# Patient Record
Sex: FEMALE | Race: BLACK OR AFRICAN AMERICAN | Employment: FULL TIME | URBAN - METROPOLITAN AREA
[De-identification: names, ages, dates, MRNs, and addresses within clinical notes are randomized per-mention and may not be internally consistent; named-entity substitution may affect disease eponyms.]

---

## 2017-07-21 ENCOUNTER — HOSPITAL ENCOUNTER (OUTPATIENT)
Dept: SURGERY | Age: 57
Discharge: HOME | End: 2017-07-21
Attending: OBSTETRICS & GYNECOLOGY
Payer: COMMERCIAL

## 2017-07-21 VITALS
HEIGHT: 63 IN | BODY MASS INDEX: 48.37 KG/M2 | WEIGHT: 273 LBS | SYSTOLIC BLOOD PRESSURE: 146 MMHG | HEART RATE: 78 BPM | OXYGEN SATURATION: 99 % | TEMPERATURE: 98.3 F | RESPIRATION RATE: 18 BRPM | DIASTOLIC BLOOD PRESSURE: 69 MMHG

## 2017-07-21 LAB
HGB BLD-MCNC: 9 G/DL (ref 11.7–15.4)
POTASSIUM SERPL-SCNC: 3.8 MMOL/L (ref 3.5–5.1)

## 2017-07-21 PROCEDURE — 85018 HEMOGLOBIN: CPT | Performed by: ANESTHESIOLOGY

## 2017-07-21 PROCEDURE — 84132 ASSAY OF SERUM POTASSIUM: CPT | Performed by: ANESTHESIOLOGY

## 2017-07-21 RX ORDER — ALBUTEROL SULFATE 2.5 MG/.5ML
2.5 SOLUTION RESPIRATORY (INHALATION)
COMMUNITY

## 2017-07-21 RX ORDER — CHOLECALCIFEROL (VITAMIN D3) 125 MCG
CAPSULE ORAL
COMMUNITY

## 2017-07-21 RX ORDER — FLUTICASONE PROPIONATE AND SALMETEROL 250; 50 UG/1; UG/1
1 POWDER RESPIRATORY (INHALATION)
COMMUNITY

## 2017-07-21 RX ORDER — LANOLIN ALCOHOL/MO/W.PET/CERES
CREAM (GRAM) TOPICAL
COMMUNITY

## 2017-07-21 RX ORDER — IBUPROFEN 200 MG
TABLET ORAL
COMMUNITY

## 2017-07-21 NOTE — PERIOP NOTES
Patient verified name, , and surgery as listed in [facility]. Type 1B surgery, PAT assessment complete. Labs per surgeon: None. Labs per anesthesia protocol: Hgb and K+; results within anesthesia limits. Abnormal HGB routed via [employer] to Dr. Shobha Rebolledo new orders received. EKG: none per anesthesia protocol. Hibiclens and instructions given per hospital policy. Patient provided with handouts including Guide to Surgery, Pain Management, Hand Hygiene, Blood Transfusion Education, and [facility] Brochure. Patient answered medical/surgical history questions at their best of ability. All prior to admission medications documented in [facility]. Original medication prescription bottle NOT visualized during patient appointment. Patient instructed to hold all vitamins 7 days prior to surgery and NSAIDS 5 days prior to surgery, patient verbalized understanding. Medications to be held: All vitamins, ibuprofen, and aleve. Patient instructed to continue previous medications as prescribed prior to surgery and to take the following medications the day of surgery according to anesthesia guidelines with a small sip of water: Albuterol nebulizer and Advair. Patient instructed to bring inhaler and CPAP machine to the hospital on the DOS and give to nurse. Patient teach back successful and demonstrates knowledge of instructions.

## 2017-07-21 NOTE — PERIOP NOTES
7/21/2017  4:07 PM - [place], Lab In [place]   Component Results   Component Value Flag Ref Range Units Status   HGB 9.0 (L) 11.7 - 15.4 g/dL Final

## 2017-07-24 ENCOUNTER — ANESTHESIA EVENT (OUTPATIENT)
Dept: SURGERY | Age: 57
End: 2017-07-24
Payer: COMMERCIAL

## 2017-07-24 RX ORDER — MIDAZOLAM HYDROCHLORIDE 1 MG/ML
2 INJECTION, SOLUTION INTRAMUSCULAR; INTRAVENOUS ONCE
Status: CANCELLED | OUTPATIENT
Start: 2017-07-24 | End: 2017-07-24

## 2017-07-24 RX ORDER — FENTANYL CITRATE 50 UG/ML
100 INJECTION, SOLUTION INTRAMUSCULAR; INTRAVENOUS ONCE
Status: CANCELLED | OUTPATIENT
Start: 2017-07-24 | End: 2017-07-24

## 2017-07-25 ENCOUNTER — HOSPITAL ENCOUNTER (OUTPATIENT)
Age: 57
Setting detail: OUTPATIENT SURGERY
Discharge: HOME | End: 2017-07-25
Attending: OBSTETRICS & GYNECOLOGY | Admitting: OBSTETRICS & GYNECOLOGY
Payer: COMMERCIAL

## 2017-07-25 ENCOUNTER — ANESTHESIA (OUTPATIENT)
Dept: SURGERY | Age: 57
End: 2017-07-25
Payer: COMMERCIAL

## 2017-07-25 VITALS
HEART RATE: 71 BPM | TEMPERATURE: 97.1 F | OXYGEN SATURATION: 96 % | WEIGHT: 273 LBS | SYSTOLIC BLOOD PRESSURE: 153 MMHG | HEIGHT: 63 IN | RESPIRATION RATE: 15 BRPM | DIASTOLIC BLOOD PRESSURE: 96 MMHG | BODY MASS INDEX: 48.37 KG/M2

## 2017-07-25 PROCEDURE — 76010000138 HC OR TIME 0.5 TO 1 HR: Performed by: OBSTETRICS & GYNECOLOGY

## 2017-07-25 PROCEDURE — 74011000250 HC RX REV CODE- 250

## 2017-07-25 PROCEDURE — 76060000032 HC ANESTHESIA 0.5 TO 1 HR: Performed by: OBSTETRICS & GYNECOLOGY

## 2017-07-25 PROCEDURE — 77030020782 HC GWN BAIR PAWS FLX 3M -B: Performed by: ANESTHESIOLOGY

## 2017-07-25 PROCEDURE — 74011250636 HC RX REV CODE- 250/636

## 2017-07-25 PROCEDURE — 76210000020 HC REC RM PH II FIRST 0.5 HR: Performed by: OBSTETRICS & GYNECOLOGY

## 2017-07-25 PROCEDURE — 77030008703 HC TU ET UNCUF COVD -A: Performed by: ANESTHESIOLOGY

## 2017-07-25 PROCEDURE — 88305 TISSUE EXAM BY PATHOLOGIST: CPT | Performed by: OBSTETRICS & GYNECOLOGY

## 2017-07-25 PROCEDURE — 74011250636 HC RX REV CODE- 250/636: Performed by: ANESTHESIOLOGY

## 2017-07-25 PROCEDURE — 77030012317 HC CATH URET INT COVD -A: Performed by: OBSTETRICS & GYNECOLOGY

## 2017-07-25 PROCEDURE — 74011250637 HC RX REV CODE- 250/637: Performed by: ANESTHESIOLOGY

## 2017-07-25 PROCEDURE — 74011000250 HC RX REV CODE- 250: Performed by: ANESTHESIOLOGY

## 2017-07-25 PROCEDURE — 77030008477 HC STYL SATN SLP COVD -A: Performed by: ANESTHESIOLOGY

## 2017-07-25 PROCEDURE — 76210000016 HC OR PH I REC 1 TO 1.5 HR: Performed by: OBSTETRICS & GYNECOLOGY

## 2017-07-25 PROCEDURE — 77030018836 HC SOL IRR NACL ICUM -A: Performed by: OBSTETRICS & GYNECOLOGY

## 2017-07-25 RX ORDER — FENTANYL CITRATE 50 UG/ML
INJECTION, SOLUTION INTRAMUSCULAR; INTRAVENOUS AS NEEDED
Status: DISCONTINUED | OUTPATIENT
Start: 2017-07-25 | End: 2017-07-25 | Stop reason: HOSPADM

## 2017-07-25 RX ORDER — APREPITANT 40 MG/1
40 CAPSULE ORAL ONCE
Status: COMPLETED | OUTPATIENT
Start: 2017-07-25 | End: 2017-07-25

## 2017-07-25 RX ORDER — DEXAMETHASONE SODIUM PHOSPHATE 4 MG/ML
INJECTION, SOLUTION INTRA-ARTICULAR; INTRALESIONAL; INTRAMUSCULAR; INTRAVENOUS; SOFT TISSUE AS NEEDED
Status: DISCONTINUED | OUTPATIENT
Start: 2017-07-25 | End: 2017-07-25 | Stop reason: HOSPADM

## 2017-07-25 RX ORDER — HYDROMORPHONE HYDROCHLORIDE 2 MG/ML
0.5 INJECTION, SOLUTION INTRAMUSCULAR; INTRAVENOUS; SUBCUTANEOUS
Status: DISCONTINUED | OUTPATIENT
Start: 2017-07-25 | End: 2017-07-25 | Stop reason: HOSPADM

## 2017-07-25 RX ORDER — LIDOCAINE HYDROCHLORIDE 10 MG/ML
0.1 INJECTION INFILTRATION; PERINEURAL AS NEEDED
Status: DISCONTINUED | OUTPATIENT
Start: 2017-07-25 | End: 2017-07-25 | Stop reason: HOSPADM

## 2017-07-25 RX ORDER — SUCCINYLCHOLINE CHLORIDE 20 MG/ML
INJECTION INTRAMUSCULAR; INTRAVENOUS AS NEEDED
Status: DISCONTINUED | OUTPATIENT
Start: 2017-07-25 | End: 2017-07-25 | Stop reason: HOSPADM

## 2017-07-25 RX ORDER — OXYCODONE HYDROCHLORIDE 5 MG/1
5 TABLET ORAL
Status: DISCONTINUED | OUTPATIENT
Start: 2017-07-25 | End: 2017-07-25 | Stop reason: HOSPADM

## 2017-07-25 RX ORDER — NALOXONE HYDROCHLORIDE 0.4 MG/ML
0.2 INJECTION, SOLUTION INTRAMUSCULAR; INTRAVENOUS; SUBCUTANEOUS AS NEEDED
Status: DISCONTINUED | OUTPATIENT
Start: 2017-07-25 | End: 2017-07-25 | Stop reason: HOSPADM

## 2017-07-25 RX ORDER — SODIUM CHLORIDE, SODIUM LACTATE, POTASSIUM CHLORIDE, CALCIUM CHLORIDE 600; 310; 30; 20 MG/100ML; MG/100ML; MG/100ML; MG/100ML
75 INJECTION, SOLUTION INTRAVENOUS CONTINUOUS
Status: DISCONTINUED | OUTPATIENT
Start: 2017-07-25 | End: 2017-07-25 | Stop reason: HOSPADM

## 2017-07-25 RX ORDER — TRAMADOL HYDROCHLORIDE 50 MG/1
50 TABLET ORAL
Qty: 10 TAB | Refills: 0 | Status: SHIPPED | OUTPATIENT
Start: 2017-07-25

## 2017-07-25 RX ORDER — ACETAMINOPHEN 500 MG
1000 TABLET ORAL ONCE
Status: COMPLETED | OUTPATIENT
Start: 2017-07-25 | End: 2017-07-25

## 2017-07-25 RX ORDER — LIDOCAINE HYDROCHLORIDE 20 MG/ML
INJECTION, SOLUTION EPIDURAL; INFILTRATION; INTRACAUDAL; PERINEURAL AS NEEDED
Status: DISCONTINUED | OUTPATIENT
Start: 2017-07-25 | End: 2017-07-25 | Stop reason: HOSPADM

## 2017-07-25 RX ORDER — FAMOTIDINE 20 MG/1
20 TABLET, FILM COATED ORAL ONCE
Status: COMPLETED | OUTPATIENT
Start: 2017-07-25 | End: 2017-07-25

## 2017-07-25 RX ORDER — VECURONIUM BROMIDE FOR INJECTION 1 MG/ML
INJECTION, POWDER, LYOPHILIZED, FOR SOLUTION INTRAVENOUS AS NEEDED
Status: DISCONTINUED | OUTPATIENT
Start: 2017-07-25 | End: 2017-07-25 | Stop reason: HOSPADM

## 2017-07-25 RX ORDER — MIDAZOLAM HYDROCHLORIDE 1 MG/ML
2 INJECTION, SOLUTION INTRAMUSCULAR; INTRAVENOUS
Status: DISCONTINUED | OUTPATIENT
Start: 2017-07-25 | End: 2017-07-25 | Stop reason: HOSPADM

## 2017-07-25 RX ORDER — ONDANSETRON 2 MG/ML
INJECTION INTRAMUSCULAR; INTRAVENOUS AS NEEDED
Status: DISCONTINUED | OUTPATIENT
Start: 2017-07-25 | End: 2017-07-25 | Stop reason: HOSPADM

## 2017-07-25 RX ORDER — PROPOFOL 10 MG/ML
INJECTION, EMULSION INTRAVENOUS AS NEEDED
Status: DISCONTINUED | OUTPATIENT
Start: 2017-07-25 | End: 2017-07-25 | Stop reason: HOSPADM

## 2017-07-25 RX ADMIN — SUCCINYLCHOLINE CHLORIDE 160 MG: 20 INJECTION INTRAMUSCULAR; INTRAVENOUS at 13:06

## 2017-07-25 RX ADMIN — LIDOCAINE HYDROCHLORIDE 0.1 ML: 10 INJECTION, SOLUTION INFILTRATION; PERINEURAL at 12:42

## 2017-07-25 RX ADMIN — MIDAZOLAM HYDROCHLORIDE 2 MG: 1 INJECTION, SOLUTION INTRAMUSCULAR; INTRAVENOUS at 12:46

## 2017-07-25 RX ADMIN — PROPOFOL 180 MG: 10 INJECTION, EMULSION INTRAVENOUS at 13:06

## 2017-07-25 RX ADMIN — LIDOCAINE HYDROCHLORIDE 60 MG: 20 INJECTION, SOLUTION EPIDURAL; INFILTRATION; INTRACAUDAL; PERINEURAL at 13:06

## 2017-07-25 RX ADMIN — FENTANYL CITRATE 100 MCG: 50 INJECTION, SOLUTION INTRAMUSCULAR; INTRAVENOUS at 13:03

## 2017-07-25 RX ADMIN — FAMOTIDINE 20 MG: 20 TABLET ORAL at 12:41

## 2017-07-25 RX ADMIN — ACETAMINOPHEN 1000 MG: 500 TABLET, FILM COATED ORAL at 12:41

## 2017-07-25 RX ADMIN — APREPITANT 40 MG: 40 CAPSULE ORAL at 12:41

## 2017-07-25 RX ADMIN — DEXAMETHASONE SODIUM PHOSPHATE 8 MG: 4 INJECTION, SOLUTION INTRA-ARTICULAR; INTRALESIONAL; INTRAMUSCULAR; INTRAVENOUS; SOFT TISSUE at 13:11

## 2017-07-25 RX ADMIN — VECURONIUM BROMIDE FOR INJECTION 1 MG: 1 INJECTION, POWDER, LYOPHILIZED, FOR SOLUTION INTRAVENOUS at 13:06

## 2017-07-25 RX ADMIN — SODIUM CHLORIDE, SODIUM LACTATE, POTASSIUM CHLORIDE, AND CALCIUM CHLORIDE 75 ML/HR: 600; 310; 30; 20 INJECTION, SOLUTION INTRAVENOUS at 12:42

## 2017-07-25 RX ADMIN — ONDANSETRON 4 MG: 2 INJECTION INTRAMUSCULAR; INTRAVENOUS at 13:11

## 2017-07-25 NOTE — BRIEF OP NOTE
BRIEF OPERATIVE NOTE    Date of Procedure: 7/25/2017   Preoperative Diagnosis: Anemia, unspecified type [D64.9]  Abnormal uterine bleeding (AUB) [N93.9]  Postoperative Diagnosis: Anemia, unspecified type [D64.9]  Abnormal uterine bleeding (AUB) [N93.9]    Procedure(s):  DILATATION AND CURETTAGE HYSTEROSCOPY   Surgeon(s) and Role:     * Alonso Florence MD - Primary         Assistant Staff:       Surgical Staff:  Circ-1: Paulette Ware RN  Scrub Tech-1: Soha Arias  Event Time In   Incision Start 1317   Incision Close 1331     Anesthesia: General   Estimated Blood Loss: 10cc  Specimens:   ID Type Source Tests Collected by Time Destination   1 : endometrial currettings Preservative   Alonso Florence MD 7/25/2017 1325 Pathology      Findings: uterus sounded 8.5 cm. Endometrial cavity was uniform. Moderate amount of tissue returned on curettage. Complications: one small uterine perforation in the fundus. No bleeding noted.   Implants: * No implants in log *

## 2017-07-25 NOTE — ANESTHESIA PREPROCEDURE EVALUATION
Anesthetic History   No history of anesthetic complications            Review of Systems / Medical History  Patient summary reviewed and pertinent labs reviewed    Pulmonary        Sleep apnea: CPAP    Asthma : well controlled       Neuro/Psych   Within defined limits           Cardiovascular    Hypertension: well controlled              Exercise tolerance: >4 METS  Comments: Denies CP, SOB or changes in functional status   GI/Hepatic/Renal     GERD (No symptoms since Lap fox): well controlled           Endo/Other        Morbid obesity and arthritis     Other Findings              Physical Exam    Airway  Mallampati: II  TM Distance: 4 - 6 cm  Neck ROM: normal range of motion   Mouth opening: Normal     Cardiovascular    Rhythm: regular  Rate: normal         Dental    Dentition: Upper braces and Lower braces     Pulmonary  Breath sounds clear to auscultation               Abdominal  GI exam deferred       Other Findings            Anesthetic Plan    ASA: 2  Anesthesia type: general          Induction: Intravenous  Anesthetic plan and risks discussed with: Patient

## 2017-07-25 NOTE — ANESTHESIA POSTPROCEDURE EVALUATION
Post-Anesthesia Evaluation and Assessment    Patient: Trev Paul MRN: [id number]  SSN: [id number]    YOB: 1960  Age: 65 y.o. Sex: female       Cardiovascular Function/Vital Signs  Visit Vitals    /67    Pulse 73    Temp 36.2 °C (97.1 °F)    Resp 15    Ht 5' 3\" (1.6 m)    Wt 123.8 kg (273 lb)    SpO2 95%    BMI 48.36 kg/m2       Patient is status post general anesthesia for Procedure(s):  DILATATION AND CURETTAGE HYSTEROSCOPY . Nausea/Vomiting: None    Postoperative hydration reviewed and adequate. Pain:  Pain Scale 1: Visual (07/25/17 1348)  Pain Intensity 1: 0 (07/25/17 1348)   Managed    Neurological Status:   Neuro (WDL): Exceptions to WDL (07/25/17 1348)  Neuro  Neurologic State: Lethargic (07/25/17 1348)   At baseline    Mental Status and Level of Consciousness: Arousable    Pulmonary Status:   O2 Device: Nasal cannula (07/25/17 1353)   Adequate oxygenation and airway patent    Complications related to anesthesia: None    Post-anesthesia assessment completed. No concerns. Pt doing well.      Signed By: Melyssa Davis MD     July 25, 2017

## 2017-07-25 NOTE — IP AVS SNAPSHOT
Bakari Hewitt 
 
 
 [address] 
[phone] Patient: Eliana Jordan MRN: [id number] TMB:[birth year] You are allergic to the following Allergen Reactions Codeine Hives Recent Documentation Height Weight BMI OB Status Smoking Status 1.6 m 123.8 kg 48.36 kg/m2 Having regular periods Never Smoker Emergency Contacts Name Discharge Info Relation Home Work Mobile Laura Brothers DISCHARGE CAREGIVER [3] Sister [23]   [phone] About your hospitalization You were admitted on:  July 25, 2017 You last received care in the:  [facility] PACU You were discharged on:  July 25, 2017 Unit phone number:  [phone] Why you were hospitalized Your primary diagnosis was:  Not on File Providers Seen During Your Hospitalizations Provider Role Specialty Primary office phone Marilou cMkeon MD Attending Provider Obstetrics & Gynecology [phone] Your Primary Care Physician (PCP) Primary Care Physician Office Phone Office Fax OTHER, PHYS ** None ** ** None ** Follow-up Information Follow up With Details Comments Contact Info Marilou Mckeon MD  as directed [address] [facility] [place] [address] 
[phone] Radha Mukherjee MD   Patient can only remember the practice name and not the physician Current Discharge Medication List  
  
START taking these medications Dose & Instructions Dispensing Information Comments Morning Noon Evening Bedtime  
 traMADol 50 mg tablet Commonly known as:  ULTRAM  
   
Your last dose was: Your next dose is:    
   
   
 Dose:  50 mg Take 1 Tab by mouth every six (6) hours as needed for Pain. Max Daily Amount: 200 mg. Quantity:  10 Tab Refills:  0 CONTINUE these medications which have NOT CHANGED Dose & Instructions Dispensing Information Comments Morning Noon Evening Bedtime ADVAIR DISKUS 250-50 mcg/dose diskus inhaler Generic drug:  fluticasone-salmeterol Your last dose was: Your next dose is:    
   
   
 Dose:  1 Puff Take 1 Puff by inhalation daily as needed. Use DOS per anesthesia protocol. Instructed to bring to the hospital on the DOS per anesthesia protocol. Refills:  0 ALEVE 220 mg Cap Generic drug:  naproxen sodium Your last dose was: Your next dose is: Take  by mouth daily as needed. Refills:  0  
     
   
   
   
  
 cpap machine kit Your last dose was: Your next dose is:    
   
   
 by Does Not Apply route. 8 cm Refills:  0  
     
   
   
   
  
 cyanocobalamin 1,000 mcg tablet Your last dose was: Your next dose is:    
   
   
 Dose:  1000 mcg Take 1,000 mcg by mouth daily. Refills:  0  
     
   
   
   
  
 ibuprofen 200 mg tablet Commonly known as:  MOTRIN Your last dose was: Your next dose is: Take  by mouth every six (6) hours as needed for Pain. Refills:  0 Iron 325 mg (65 mg iron) tablet Generic drug:  ferrous sulfate Your last dose was: Your next dose is: Take  by mouth three (3) times daily (with meals). Refills:  0  
     
   
   
   
  
 losartan 100 mg tablet Commonly known as:  COZAAR Your last dose was: Your next dose is:    
   
   
 Dose:  100 mg Take 100 mg by mouth daily. Refills:  0  
     
   
   
   
  
 multivitamin tablet Commonly known as:  ONE A DAY Your last dose was: Your next dose is:    
   
   
 Dose:  1 Tab Take 1 Tab by mouth daily. Refills:  0  
     
   
   
   
  
 triamterene-hydroCHLOROthiazide 37.5-25 mg per capsule Commonly known as:  Bety Deleon Your last dose was: Your next dose is: Take  by mouth daily. Refills:  0  
     
   
   
   
  
 VITAMIN C 250 mg tablet Generic drug:  ascorbic acid (vitamin C) Your last dose was: Your next dose is: Take  by mouth. Refills:  0 ASK your doctor about these medications Dose & Instructions Dispensing Information Comments Morning Noon Evening Bedtime  
 albuterol sulfate 2.5 mg/0.5 mL Nebu nebulizer solution Commonly known as:  PROVENTIL;VENTOLIN Your last dose was: Your next dose is:    
   
   
 Dose:  2.5 mg Take 2.5 mg by inhalation daily as needed. Use DOS per anesthesia protocol. Refills:  0 Where to Get Your Medications Information on where to get these meds will be given to you by the nurse or doctor. ! Ask your nurse or doctor about these medications  
  traMADol 50 mg tablet Discharge Instructions Hysteroscopy With Dilation and Curettage: What to Expect at [facility] Your Recovery For a hysteroscopy, your doctor guides a lighted tube through your cervix and into your uterus. This helps the doctor see inside your uterus. For a dilation and curettage (D&C), your doctor uses a curved tool, called a curette, to gently scrape tissue from your uterus. After these procedures, you are likely to have a backache or cramps similar to menstrual cramps. Expect to pass small clots of blood from your vagina for the first few days. You may have light vaginal bleeding for several weeks after the D&C. If the doctor filled your uterus with air, you may have gas pains or your belly may feel full. You may also have shoulder pain. These symptoms should go away in 1 to 2 days. You will probably be able to go back to most of your normal activities in 1 or 2 days. This care sheet gives you a general idea about how long it will take for you to recover. But each person recovers at a different pace.  Follow the steps below to get better as quickly as possible. How can you care for yourself at home? Activity · Rest when you feel tired. · Most women are able to return to work the day after the procedure. · Wear sanitary pads if needed. Do not douche or use tampons for 2 weeks or until your doctor says it is okay. · Ask your doctor when it is okay for you to have sex. · If you could become pregnant, talk about birth control with your doctor. Do not try to become pregnant until your doctor says it is okay. Diet · You can eat your normal diet. If your stomach is upset, try bland, low-fat foods like plain rice, broiled chicken, toast, and yogurt. · Drink plenty of fluids (unless your doctor tells you not to). Medicines · Your doctor will tell you if and when you can restart your medicines. He or she will also give you instructions about taking any new medicines. · If you take blood thinners, such as warfarin (Coumadin), clopidogrel (Plavix), or aspirin, be sure to talk to your doctor. He or she will tell you if and when to start taking those medicines again. Make sure that you understand exactly what your doctor wants you to do. · Be safe with medicines. Read and follow all instructions on the label. ¨ If the doctor gave you a prescription medicine for pain, take it as prescribed. ¨ If you are not taking a prescription pain medicine, ask your doctor if you can take an over-the-counter medicine. · If your doctor prescribed antibiotics, take them as directed. Do not stop taking them just because you feel better. You need to take the full course of antibiotics. Follow-up care is a key part of your treatment and safety. Be sure to make and go to all appointments, and call your doctor if you are having problems. It's also a good idea to know your test results and keep a list of the medicines you take. When should you call for help? Call 911 anytime you think you may need emergency care. For example, call if: · You passed out (lost consciousness). · You have severe trouble breathing. · You have sudden chest pain and shortness of breath, or you cough up blood. · You have sudden, severe pain in your belly. Call your doctor now or seek immediate medical care if: 
· You have severe vaginal bleeding. This means that you are soaking through your usual pads or tampons every hour for 2 or more hours. · You pass blood clots that are larger than a golf ball. · You have a vaginal discharge that smells bad. · You are sick to your stomach or cannot keep fluids down. · You have pain that does not get better after you take pain medicine. · You have a fever over 100°F. 
· You have trouble passing urine or stool, especially if you have pain or swelling in your lower belly. · You have pain, discomfort, or bleeding with sex. Watch closely for changes in your health, and be sure to contact your doctor if: 
· You do not get better as expected. Where can you learn more? Go to http://beka-robert.info/. Enter J704 in the search box to learn more about \"Hysteroscopy With Dilation and Curettage: What to Expect at Home. \" Current as of: March 16, 2017 Content Version: 11.3 © 7495-7791 [employer]. Care instructions adapted under license by [employer] (which disclaims liability or warranty for this information). If you have questions about a medical condition or this instruction, always ask your healthcare professional. [place] [address] any warranty or liability for your use of this information. Discharge Orders None Introducing [facility]! [facility] introduces [employer] patient portal. Now you can access parts of your medical record, email your doctor's office, and request medication refills online. 1. In your internet browser, go to [email or web address] 2. Click on the First Time User? Click Here link in the Sign In box.  You will see the New Member Sign Up page. 3. Enter your Peerlyst Access Code exactly as it appears below. You will not need to use this code after youve completed the sign-up process. If you do not sign up before the expiration date, you must request a new code. · Peer[employer] Access Code: [email or web address] Expires: 10/8/2017 10:38 AM 
 
4. Enter the last four digits of your Social Security Number (xxxx) and Date of Birth (mm/dd/yyyy) as indicated and click Submit. You will be taken to the next sign-up page. 5. Create a Peerlyst ID. This will be your Peerlyst login ID and cannot be changed, so think of one that is secure and easy to remember. 6. Create a Peerlyst password. You can change your password at any time. 7. Enter your Password Reset Question and Answer. This can be used at a later time if you forget your password. 8. Enter your e-mail address. You will receive e-mail notification when new information is available in [address] 9. Click Sign Up. You can now view and download portions of your medical record. 10. Click the Download Summary menu link to download a portable copy of your medical information. If you have questions, please visit the Frequently Asked Questions section of the [employer] website. Remember, [employer] is NOT to be used for urgent needs. For medical emergencies, dial 911. Now available from your iPhone and Android! General Information Please provide this summary of care documentation to your next provider. Patient Signature:  ____________________________________________________________ Date:  ____________________________________________________________  
  
Talha Endo Provider Signature:  ____________________________________________________________ Date:  ____________________________________________________________

## 2017-07-25 NOTE — DISCHARGE INSTRUCTIONS
Hysteroscopy With Dilation and Curettage: What to Expect at [address]  For a hysteroscopy, your doctor guides a lighted tube through your cervix and into your uterus. This helps the doctor see inside your uterus. For a dilation and curettage (D&C), your doctor uses a curved tool, called a curette, to gently scrape tissue from your uterus. After these procedures, you are likely to have a backache or cramps similar to menstrual cramps. Expect to pass small clots of blood from your vagina for the first few days. You may have light vaginal bleeding for several weeks after the D&C. If the doctor filled your uterus with air, you may have gas pains or your belly may feel full. You may also have shoulder pain. These symptoms should go away in 1 to 2 days. You will probably be able to go back to most of your normal activities in 1 or 2 days. This care sheet gives you a general idea about how long it will take for you to recover. But each person recovers at a different pace. Follow the steps below to get better as quickly as possible. How can you care for yourself at home? Activity  · Rest when you feel tired. · Most women are able to return to work the day after the procedure. · Wear sanitary pads if needed. Do not douche or use tampons for 2 weeks or until your doctor says it is okay. · Ask your doctor when it is okay for you to have sex. · If you could become pregnant, talk about birth control with your doctor. Do not try to become pregnant until your doctor says it is okay. Diet  · You can eat your normal diet. If your stomach is upset, try bland, low-fat foods like plain rice, broiled chicken, toast, and yogurt. · Drink plenty of fluids (unless your doctor tells you not to). Medicines  · Your doctor will tell you if and when you can restart your medicines. He or she will also give you instructions about taking any new medicines.   · If you take blood thinners, such as warfarin (Coumadin), clopidogrel (Plavix), or aspirin, be sure to talk to your doctor. He or she will tell you if and when to start taking those medicines again. Make sure that you understand exactly what your doctor wants you to do. · Be safe with medicines. Read and follow all instructions on the label. ¨ If the doctor gave you a prescription medicine for pain, take it as prescribed. ¨ If you are not taking a prescription pain medicine, ask your doctor if you can take an over-the-counter medicine. · If your doctor prescribed antibiotics, take them as directed. Do not stop taking them just because you feel better. You need to take the full course of antibiotics. Follow-up care is a key part of your treatment and safety. Be sure to make and go to all appointments, and call your doctor if you are having problems. It's also a good idea to know your test results and keep a list of the medicines you take. When should you call for help? Call 911 anytime you think you may need emergency care. For example, call if:  · You passed out (lost consciousness). · You have severe trouble breathing. · You have sudden chest pain and shortness of breath, or you cough up blood. · You have sudden, severe pain in your belly. Call your doctor now or seek immediate medical care if:  · You have severe vaginal bleeding. This means that you are soaking through your usual pads or tampons every hour for 2 or more hours. · You pass blood clots that are larger than a golf ball. · You have a vaginal discharge that smells bad. · You are sick to your stomach or cannot keep fluids down. · You have pain that does not get better after you take pain medicine. · You have a fever over 100°F.  · You have trouble passing urine or stool, especially if you have pain or swelling in your lower belly. · You have pain, discomfort, or bleeding with sex.   Watch closely for changes in your health, and be sure to contact your doctor if:  · You do not get better as expected. Where can you learn more? Go to http://beka-robert.info/. Enter K177 in the search box to learn more about \"Hysteroscopy With Dilation and Curettage: What to Expect at Home. \"  Current as of: March 16, 2017  Content Version: 11.3  © 7598-0229 [employer]. Care instructions adapted under license by [employer] (which disclaims liability or warranty for this information). If you have questions about a medical condition or this instruction, always ask your healthcare professional. [address] any warranty or liability for your use of this information.

## 2017-07-25 NOTE — OP NOTES
[address] [address]   OPERATIVE REPORT       Name:  Ancelmo Leach   MR#:  [id number]   :  1960   Account #:  [de-identified]   Date of Adm:  2017       DATE OF SURGERY: 2017     PROCEDURE: Hysteroscopy and dilation and curettage. SURGEON: Nell Hernandez MD     ANESTHESIA: General.     PREOPERATIVE DIAGNOSIS: Abnormal uterine bleeding. POSTOPERATIVE DIAGNOSIS: Abnormal uterine bleeding. Pathology   pending. FINDINGS: Uterus sounded to 8.5 cm. Endometrial cavity was   uniform and moderate tissue returned on curettage. DESCRIPTION OF PROCEDURE: The patient was placed on the   operating table and after satisfactory anesthesia was obtained,   was prepped and draped in sterile fashion for a vaginal   procedure in the dorsal lithotomy position. A weighted speculum   was put in the vagina and the anterior lip of the cervix grasped   with a single-tooth tenaculum. The uterus was sounded to 8.5 cm. The cervix was easily dilated to a #19 Gallo dilator. Hysteroscope was then advanced through the cervix and endocervix   into the endometrial cavity with the above findings. The scope   was removed and the endometrial cavity lightly curetted with a   small sharp curette with moderate tissue returned. The scope was   then reinserted into the endometrial cavity and though it was   not recognized during the curettage, there was a small   perforation in the fundus. No bleeding was noted. Therefore, the   NovaSure was not done. The endometrial cavity was explored with   stone forceps to remove free fragments of tissue. Estimated   blood loss 10 mL. The patient tolerated the procedure well.         MD Satinder Taveras / Colleen Napier   D:  2017   14:03   T:  2017   14:25   Job #:  [id number]

## 2023-11-06 ENCOUNTER — APPOINTMENT (OUTPATIENT)
Dept: RADIOLOGY | Facility: HOSPITAL | Age: 63
DRG: 871 | End: 2023-11-06
Payer: COMMERCIAL

## 2023-11-06 ENCOUNTER — APPOINTMENT (OUTPATIENT)
Dept: CARDIOLOGY | Facility: HOSPITAL | Age: 63
DRG: 871 | End: 2023-11-06
Payer: COMMERCIAL

## 2023-11-06 ENCOUNTER — HOSPITAL ENCOUNTER (INPATIENT)
Facility: HOSPITAL | Age: 63
LOS: 2 days | Discharge: HOME | DRG: 871 | End: 2023-11-09
Attending: EMERGENCY MEDICINE | Admitting: INTERNAL MEDICINE
Payer: COMMERCIAL

## 2023-11-06 DIAGNOSIS — R09.02 HYPOXIA: ICD-10-CM

## 2023-11-06 DIAGNOSIS — U07.1 COVID-19 VIRUS INFECTION: Primary | ICD-10-CM

## 2023-11-06 PROBLEM — J96.01 ACUTE HYPOXIC RESPIRATORY FAILURE (MULTI): Status: ACTIVE | Noted: 2023-11-06

## 2023-11-06 PROBLEM — I71.20 THORACIC AORTIC ANEURYSM (CMS-HCC): Status: ACTIVE | Noted: 2023-11-06

## 2023-11-06 PROBLEM — E66.9 CLASS 1 OBESITY WITH BODY MASS INDEX (BMI) OF 31.0 TO 31.9 IN ADULT: Status: ACTIVE | Noted: 2023-11-06

## 2023-11-06 LAB
ALBUMIN SERPL BCP-MCNC: 3.9 G/DL (ref 3.4–5)
ALP SERPL-CCNC: 91 U/L (ref 33–136)
ALT SERPL W P-5'-P-CCNC: 53 U/L (ref 7–45)
ANION GAP BLDV CALCULATED.4IONS-SCNC: 11 MMOL/L (ref 10–25)
ANION GAP SERPL CALC-SCNC: 15 MMOL/L (ref 10–20)
AST SERPL W P-5'-P-CCNC: 42 U/L (ref 9–39)
BASE EXCESS BLDV CALC-SCNC: 1 MMOL/L (ref -2–3)
BASOPHILS # BLD AUTO: 0.03 X10*3/UL (ref 0–0.1)
BASOPHILS NFR BLD AUTO: 0.4 %
BILIRUB SERPL-MCNC: 0.7 MG/DL (ref 0–1.2)
BODY TEMPERATURE: 37 DEGREES CELSIUS
BUN SERPL-MCNC: 14 MG/DL (ref 6–23)
CA-I BLDV-SCNC: 1.13 MMOL/L (ref 1.1–1.33)
CALCIUM SERPL-MCNC: 8.8 MG/DL (ref 8.6–10.3)
CHLORIDE BLDV-SCNC: 100 MMOL/L (ref 98–107)
CHLORIDE SERPL-SCNC: 101 MMOL/L (ref 98–107)
CO2 SERPL-SCNC: 22 MMOL/L (ref 21–32)
CREAT SERPL-MCNC: 0.66 MG/DL (ref 0.5–1.05)
CRP SERPL-MCNC: 1.24 MG/DL
D DIMER PPP FEU-MCNC: 1763 NG/ML FEU
EOSINOPHIL # BLD AUTO: 0.01 X10*3/UL (ref 0–0.7)
EOSINOPHIL NFR BLD AUTO: 0.1 %
ERYTHROCYTE [DISTWIDTH] IN BLOOD BY AUTOMATED COUNT: 13.8 % (ref 11.5–14.5)
FERRITIN SERPL-MCNC: 57 NG/ML (ref 8–150)
GFR SERPL CREATININE-BSD FRML MDRD: >90 ML/MIN/1.73M*2
GLUCOSE BLDV-MCNC: 110 MG/DL (ref 74–99)
GLUCOSE SERPL-MCNC: 102 MG/DL (ref 74–99)
HCO3 BLDV-SCNC: 25.2 MMOL/L (ref 22–26)
HCT VFR BLD AUTO: 43.2 % (ref 36–46)
HCT VFR BLD EST: 46 % (ref 36–46)
HGB BLD-MCNC: 14.9 G/DL (ref 12–16)
HGB BLDV-MCNC: 15.4 G/DL (ref 12–16)
IMM GRANULOCYTES # BLD AUTO: 0.04 X10*3/UL (ref 0–0.7)
IMM GRANULOCYTES NFR BLD AUTO: 0.5 % (ref 0–0.9)
INHALED O2 CONCENTRATION: 21 %
INR PPP: 1.2 (ref 0.9–1.1)
LACTATE BLDV-SCNC: 1.2 MMOL/L (ref 0.4–2)
LYMPHOCYTES # BLD AUTO: 0.67 X10*3/UL (ref 1.2–4.8)
LYMPHOCYTES NFR BLD AUTO: 7.9 %
MCH RBC QN AUTO: 30.5 PG (ref 26–34)
MCHC RBC AUTO-ENTMCNC: 34.5 G/DL (ref 32–36)
MCV RBC AUTO: 88 FL (ref 80–100)
MONOCYTES # BLD AUTO: 0.6 X10*3/UL (ref 0.1–1)
MONOCYTES NFR BLD AUTO: 7.1 %
NEUTROPHILS # BLD AUTO: 7.16 X10*3/UL (ref 1.2–7.7)
NEUTROPHILS NFR BLD AUTO: 84 %
NRBC BLD-RTO: 0 /100 WBCS (ref 0–0)
OXYHGB MFR BLDV: 70.1 % (ref 45–75)
PCO2 BLDV: 38 MM HG (ref 41–51)
PH BLDV: 7.43 PH (ref 7.33–7.43)
PLATELET # BLD AUTO: 142 X10*3/UL (ref 150–450)
PO2 BLDV: 43 MM HG (ref 35–45)
POTASSIUM BLDV-SCNC: 3.8 MMOL/L (ref 3.5–5.3)
POTASSIUM SERPL-SCNC: 3.7 MMOL/L (ref 3.5–5.3)
PROT SERPL-MCNC: 7.3 G/DL (ref 6.4–8.2)
PROTHROMBIN TIME: 13 SECONDS (ref 9.8–12.8)
RBC # BLD AUTO: 4.89 X10*6/UL (ref 4–5.2)
SAO2 % BLDV: 71 % (ref 45–75)
SARS-COV-2 RNA RESP QL NAA+PROBE: DETECTED
SODIUM BLDV-SCNC: 132 MMOL/L (ref 136–145)
SODIUM SERPL-SCNC: 134 MMOL/L (ref 136–145)
WBC # BLD AUTO: 8.5 X10*3/UL (ref 4.4–11.3)

## 2023-11-06 PROCEDURE — 96375 TX/PRO/DX INJ NEW DRUG ADDON: CPT

## 2023-11-06 PROCEDURE — 87075 CULTR BACTERIA EXCEPT BLOOD: CPT | Mod: PORLAB | Performed by: EMERGENCY MEDICINE

## 2023-11-06 PROCEDURE — 96361 HYDRATE IV INFUSION ADD-ON: CPT

## 2023-11-06 PROCEDURE — 93005 ELECTROCARDIOGRAM TRACING: CPT

## 2023-11-06 PROCEDURE — 96366 THER/PROPH/DIAG IV INF ADDON: CPT

## 2023-11-06 PROCEDURE — 99223 1ST HOSP IP/OBS HIGH 75: CPT | Performed by: PHYSICIAN ASSISTANT

## 2023-11-06 PROCEDURE — 36415 COLL VENOUS BLD VENIPUNCTURE: CPT | Performed by: EMERGENCY MEDICINE

## 2023-11-06 PROCEDURE — 2500000004 HC RX 250 GENERAL PHARMACY W/ HCPCS (ALT 636 FOR OP/ED): Performed by: EMERGENCY MEDICINE

## 2023-11-06 PROCEDURE — 82435 ASSAY OF BLOOD CHLORIDE: CPT | Performed by: EMERGENCY MEDICINE

## 2023-11-06 PROCEDURE — 71045 X-RAY EXAM CHEST 1 VIEW: CPT | Performed by: RADIOLOGY

## 2023-11-06 PROCEDURE — 99285 EMERGENCY DEPT VISIT HI MDM: CPT | Mod: 25 | Performed by: EMERGENCY MEDICINE

## 2023-11-06 PROCEDURE — 96367 TX/PROPH/DG ADDL SEQ IV INF: CPT

## 2023-11-06 PROCEDURE — 85379 FIBRIN DEGRADATION QUANT: CPT | Performed by: EMERGENCY MEDICINE

## 2023-11-06 PROCEDURE — 84075 ASSAY ALKALINE PHOSPHATASE: CPT | Performed by: EMERGENCY MEDICINE

## 2023-11-06 PROCEDURE — 82728 ASSAY OF FERRITIN: CPT | Performed by: EMERGENCY MEDICINE

## 2023-11-06 PROCEDURE — 85610 PROTHROMBIN TIME: CPT | Performed by: EMERGENCY MEDICINE

## 2023-11-06 PROCEDURE — 87635 SARS-COV-2 COVID-19 AMP PRB: CPT | Performed by: EMERGENCY MEDICINE

## 2023-11-06 PROCEDURE — 3E0333Z INTRODUCTION OF ANTI-INFLAMMATORY INTO PERIPHERAL VEIN, PERCUTANEOUS APPROACH: ICD-10-PCS | Performed by: EMERGENCY MEDICINE

## 2023-11-06 PROCEDURE — 96374 THER/PROPH/DIAG INJ IV PUSH: CPT | Mod: 59

## 2023-11-06 PROCEDURE — 71045 X-RAY EXAM CHEST 1 VIEW: CPT | Mod: FY

## 2023-11-06 PROCEDURE — 71275 CT ANGIOGRAPHY CHEST: CPT

## 2023-11-06 PROCEDURE — 85025 COMPLETE CBC W/AUTO DIFF WBC: CPT | Performed by: EMERGENCY MEDICINE

## 2023-11-06 PROCEDURE — 96365 THER/PROPH/DIAG IV INF INIT: CPT

## 2023-11-06 PROCEDURE — 86140 C-REACTIVE PROTEIN: CPT | Performed by: EMERGENCY MEDICINE

## 2023-11-06 PROCEDURE — 71275 CT ANGIOGRAPHY CHEST: CPT | Performed by: STUDENT IN AN ORGANIZED HEALTH CARE EDUCATION/TRAINING PROGRAM

## 2023-11-06 PROCEDURE — 96372 THER/PROPH/DIAG INJ SC/IM: CPT | Mod: 25

## 2023-11-06 PROCEDURE — 2550000001 HC RX 255 CONTRASTS: Performed by: EMERGENCY MEDICINE

## 2023-11-06 RX ORDER — ACETAMINOPHEN 325 MG/1
650 TABLET ORAL EVERY 4 HOURS PRN
Status: DISCONTINUED | OUTPATIENT
Start: 2023-11-06 | End: 2023-11-09 | Stop reason: HOSPADM

## 2023-11-06 RX ORDER — TALC
3 POWDER (GRAM) TOPICAL NIGHTLY PRN
Status: DISCONTINUED | OUTPATIENT
Start: 2023-11-06 | End: 2023-11-09 | Stop reason: HOSPADM

## 2023-11-06 RX ORDER — ACETAMINOPHEN 325 MG/1
650 TABLET ORAL ONCE
Status: COMPLETED | OUTPATIENT
Start: 2023-11-06 | End: 2023-11-06

## 2023-11-06 RX ORDER — ALBUTEROL SULFATE 90 UG/1
2 AEROSOL, METERED RESPIRATORY (INHALATION) EVERY 4 HOURS PRN
Status: DISCONTINUED | OUTPATIENT
Start: 2023-11-06 | End: 2023-11-09 | Stop reason: HOSPADM

## 2023-11-06 RX ORDER — ACETAMINOPHEN 160 MG/5ML
650 SOLUTION ORAL EVERY 4 HOURS PRN
Status: DISCONTINUED | OUTPATIENT
Start: 2023-11-06 | End: 2023-11-09 | Stop reason: HOSPADM

## 2023-11-06 RX ORDER — PANTOPRAZOLE SODIUM 40 MG/1
40 TABLET, DELAYED RELEASE ORAL
Status: DISCONTINUED | OUTPATIENT
Start: 2023-11-07 | End: 2023-11-09 | Stop reason: HOSPADM

## 2023-11-06 RX ORDER — DEXAMETHASONE 6 MG/1
6 TABLET ORAL EVERY 24 HOURS
Status: DISCONTINUED | OUTPATIENT
Start: 2023-11-07 | End: 2023-11-09 | Stop reason: HOSPADM

## 2023-11-06 RX ORDER — DEXAMETHASONE SODIUM PHOSPHATE 4 MG/ML
6 INJECTION, SOLUTION INTRA-ARTICULAR; INTRALESIONAL; INTRAMUSCULAR; INTRAVENOUS; SOFT TISSUE ONCE
Status: COMPLETED | OUTPATIENT
Start: 2023-11-06 | End: 2023-11-06

## 2023-11-06 RX ORDER — ACETAMINOPHEN 650 MG/1
650 SUPPOSITORY RECTAL EVERY 4 HOURS PRN
Status: DISCONTINUED | OUTPATIENT
Start: 2023-11-06 | End: 2023-11-09 | Stop reason: HOSPADM

## 2023-11-06 RX ORDER — PANTOPRAZOLE SODIUM 40 MG/10ML
40 INJECTION, POWDER, LYOPHILIZED, FOR SOLUTION INTRAVENOUS
Status: DISCONTINUED | OUTPATIENT
Start: 2023-11-07 | End: 2023-11-09 | Stop reason: HOSPADM

## 2023-11-06 RX ORDER — HEPARIN SODIUM 5000 [USP'U]/ML
5000 INJECTION, SOLUTION INTRAVENOUS; SUBCUTANEOUS EVERY 8 HOURS
Status: DISCONTINUED | OUTPATIENT
Start: 2023-11-06 | End: 2023-11-09 | Stop reason: HOSPADM

## 2023-11-06 RX ORDER — ONDANSETRON HYDROCHLORIDE 2 MG/ML
4 INJECTION, SOLUTION INTRAVENOUS ONCE
Status: COMPLETED | OUTPATIENT
Start: 2023-11-06 | End: 2023-11-06

## 2023-11-06 RX ORDER — POLYETHYLENE GLYCOL 3350 17 G/17G
17 POWDER, FOR SOLUTION ORAL DAILY
Status: DISCONTINUED | OUTPATIENT
Start: 2023-11-07 | End: 2023-11-09 | Stop reason: HOSPADM

## 2023-11-06 RX ADMIN — SODIUM CHLORIDE 1000 ML: 9 INJECTION, SOLUTION INTRAVENOUS at 20:28

## 2023-11-06 RX ADMIN — IOHEXOL 75 ML: 350 INJECTION, SOLUTION INTRAVENOUS at 22:19

## 2023-11-06 RX ADMIN — ONDANSETRON 4 MG: 2 INJECTION INTRAMUSCULAR; INTRAVENOUS at 20:27

## 2023-11-06 RX ADMIN — ACETAMINOPHEN 650 MG: 325 TABLET ORAL at 20:27

## 2023-11-06 RX ADMIN — DEXAMETHASONE SODIUM PHOSPHATE 6 MG: 4 INJECTION, SOLUTION INTRAMUSCULAR; INTRAVENOUS at 20:27

## 2023-11-06 ASSESSMENT — COLUMBIA-SUICIDE SEVERITY RATING SCALE - C-SSRS
2. HAVE YOU ACTUALLY HAD ANY THOUGHTS OF KILLING YOURSELF?: NO
1. IN THE PAST MONTH, HAVE YOU WISHED YOU WERE DEAD OR WISHED YOU COULD GO TO SLEEP AND NOT WAKE UP?: NO
6. HAVE YOU EVER DONE ANYTHING, STARTED TO DO ANYTHING, OR PREPARED TO DO ANYTHING TO END YOUR LIFE?: NO

## 2023-11-06 ASSESSMENT — LIFESTYLE VARIABLES
REASON UNABLE TO ASSESS: NO
HAVE YOU EVER FELT YOU SHOULD CUT DOWN ON YOUR DRINKING: NO
EVER HAD A DRINK FIRST THING IN THE MORNING TO STEADY YOUR NERVES TO GET RID OF A HANGOVER: NO
EVER FELT BAD OR GUILTY ABOUT YOUR DRINKING: NO
HAVE PEOPLE ANNOYED YOU BY CRITICIZING YOUR DRINKING: NO

## 2023-11-06 ASSESSMENT — PAIN DESCRIPTION - LOCATION: LOCATION: LEG

## 2023-11-06 ASSESSMENT — PAIN SCALES - GENERAL: PAINLEVEL_OUTOF10: 6

## 2023-11-06 ASSESSMENT — PAIN - FUNCTIONAL ASSESSMENT: PAIN_FUNCTIONAL_ASSESSMENT: 0-10

## 2023-11-07 PROBLEM — F15.10 METHAMPHETAMINE USE (MULTI): Status: ACTIVE | Noted: 2023-11-07

## 2023-11-07 PROBLEM — F17.200 TOBACCO USE DISORDER: Status: ACTIVE | Noted: 2023-11-07

## 2023-11-07 LAB
ALBUMIN SERPL BCP-MCNC: 3.7 G/DL (ref 3.4–5)
ALP SERPL-CCNC: 81 U/L (ref 33–136)
ALT SERPL W P-5'-P-CCNC: 46 U/L (ref 7–45)
AMPHETAMINES UR QL SCN: ABNORMAL
ANION GAP BLDV CALCULATED.4IONS-SCNC: 11 MMOL/L (ref 10–25)
ANION GAP SERPL CALC-SCNC: 14 MMOL/L (ref 10–20)
AST SERPL W P-5'-P-CCNC: 41 U/L (ref 9–39)
BARBITURATES UR QL SCN: ABNORMAL
BASE EXCESS BLDV CALC-SCNC: -2.2 MMOL/L (ref -2–3)
BENZODIAZ UR QL SCN: ABNORMAL
BILIRUB SERPL-MCNC: 0.6 MG/DL (ref 0–1.2)
BODY TEMPERATURE: 37 DEGREES CELSIUS
BUN SERPL-MCNC: 14 MG/DL (ref 6–23)
BZE UR QL SCN: ABNORMAL
CA-I BLDV-SCNC: 1.11 MMOL/L (ref 1.1–1.33)
CALCIUM SERPL-MCNC: 8.7 MG/DL (ref 8.6–10.3)
CANNABINOIDS UR QL SCN: ABNORMAL
CHLORIDE BLDV-SCNC: 101 MMOL/L (ref 98–107)
CHLORIDE SERPL-SCNC: 101 MMOL/L (ref 98–107)
CO2 SERPL-SCNC: 23 MMOL/L (ref 21–32)
CREAT SERPL-MCNC: 0.81 MG/DL (ref 0.5–1.05)
CRP SERPL-MCNC: 4.18 MG/DL
D DIMER PPP FEU-MCNC: 1345 NG/ML FEU
ERYTHROCYTE [DISTWIDTH] IN BLOOD BY AUTOMATED COUNT: 13.8 % (ref 11.5–14.5)
FENTANYL+NORFENTANYL UR QL SCN: ABNORMAL
FERRITIN SERPL-MCNC: 66 NG/ML (ref 8–150)
GFR SERPL CREATININE-BSD FRML MDRD: 82 ML/MIN/1.73M*2
GLUCOSE BLDV-MCNC: 163 MG/DL (ref 74–99)
GLUCOSE SERPL-MCNC: 161 MG/DL (ref 74–99)
HAV IGM SER QL: NONREACTIVE
HBV CORE IGM SER QL: NONREACTIVE
HBV SURFACE AG SERPL QL IA: NONREACTIVE
HCO3 BLDV-SCNC: 24.7 MMOL/L (ref 22–26)
HCT VFR BLD AUTO: 44.6 % (ref 36–46)
HCT VFR BLD EST: 45 % (ref 36–46)
HCV AB SER QL: NONREACTIVE
HGB BLD-MCNC: 14.9 G/DL (ref 12–16)
HGB BLDV-MCNC: 15.1 G/DL (ref 12–16)
HOLD SPECIMEN: NORMAL
INHALED O2 CONCENTRATION: 44 %
LACTATE BLDV-SCNC: 1.2 MMOL/L (ref 0.4–2)
LDH SERPL L TO P-CCNC: 306 U/L (ref 84–246)
MCH RBC QN AUTO: 29.8 PG (ref 26–34)
MCHC RBC AUTO-ENTMCNC: 33.4 G/DL (ref 32–36)
MCV RBC AUTO: 89 FL (ref 80–100)
MRSA DNA SPEC QL NAA+PROBE: NOT DETECTED
NRBC BLD-RTO: 0 /100 WBCS (ref 0–0)
OPIATES UR QL SCN: ABNORMAL
OXYCODONE+OXYMORPHONE UR QL SCN: ABNORMAL
OXYHGB MFR BLDV: 82.1 % (ref 45–75)
PCO2 BLDV: 49 MM HG (ref 41–51)
PCP UR QL SCN: ABNORMAL
PH BLDV: 7.31 PH (ref 7.33–7.43)
PLATELET # BLD AUTO: 136 X10*3/UL (ref 150–450)
PO2 BLDV: 55 MM HG (ref 35–45)
POTASSIUM BLDV-SCNC: 3.9 MMOL/L (ref 3.5–5.3)
POTASSIUM SERPL-SCNC: 3.8 MMOL/L (ref 3.5–5.3)
PROCALCITONIN SERPL-MCNC: 1.44 NG/ML
PROT SERPL-MCNC: 7.2 G/DL (ref 6.4–8.2)
RBC # BLD AUTO: 5 X10*6/UL (ref 4–5.2)
SAO2 % BLDV: 83 % (ref 45–75)
SODIUM BLDV-SCNC: 133 MMOL/L (ref 136–145)
SODIUM SERPL-SCNC: 134 MMOL/L (ref 136–145)
WBC # BLD AUTO: 6.2 X10*3/UL (ref 4.4–11.3)

## 2023-11-07 PROCEDURE — 2500000004 HC RX 250 GENERAL PHARMACY W/ HCPCS (ALT 636 FOR OP/ED): Performed by: FAMILY MEDICINE

## 2023-11-07 PROCEDURE — 96372 THER/PROPH/DIAG INJ SC/IM: CPT | Performed by: PHYSICIAN ASSISTANT

## 2023-11-07 PROCEDURE — 80307 DRUG TEST PRSMV CHEM ANLYZR: CPT | Performed by: PHYSICIAN ASSISTANT

## 2023-11-07 PROCEDURE — 2500000004 HC RX 250 GENERAL PHARMACY W/ HCPCS (ALT 636 FOR OP/ED): Performed by: PHYSICIAN ASSISTANT

## 2023-11-07 PROCEDURE — 83615 LACTATE (LD) (LDH) ENZYME: CPT | Performed by: PHYSICIAN ASSISTANT

## 2023-11-07 PROCEDURE — 82947 ASSAY GLUCOSE BLOOD QUANT: CPT | Performed by: PHYSICIAN ASSISTANT

## 2023-11-07 PROCEDURE — 82728 ASSAY OF FERRITIN: CPT | Performed by: PHYSICIAN ASSISTANT

## 2023-11-07 PROCEDURE — 84145 PROCALCITONIN (PCT): CPT | Mod: PORLAB | Performed by: PHYSICIAN ASSISTANT

## 2023-11-07 PROCEDURE — 87899 AGENT NOS ASSAY W/OPTIC: CPT | Mod: PORLAB | Performed by: FAMILY MEDICINE

## 2023-11-07 PROCEDURE — 36415 COLL VENOUS BLD VENIPUNCTURE: CPT | Performed by: PHYSICIAN ASSISTANT

## 2023-11-07 PROCEDURE — 2500000005 HC RX 250 GENERAL PHARMACY W/O HCPCS: Performed by: FAMILY MEDICINE

## 2023-11-07 PROCEDURE — 2060000001 HC INTERMEDIATE ICU ROOM DAILY

## 2023-11-07 PROCEDURE — 85027 COMPLETE CBC AUTOMATED: CPT | Performed by: PHYSICIAN ASSISTANT

## 2023-11-07 PROCEDURE — 85379 FIBRIN DEGRADATION QUANT: CPT | Performed by: PHYSICIAN ASSISTANT

## 2023-11-07 PROCEDURE — 3E0DX3Z INTRODUCTION OF ANTI-INFLAMMATORY INTO MOUTH AND PHARYNX, EXTERNAL APPROACH: ICD-10-PCS | Performed by: INTERNAL MEDICINE

## 2023-11-07 PROCEDURE — 86140 C-REACTIVE PROTEIN: CPT | Performed by: PHYSICIAN ASSISTANT

## 2023-11-07 PROCEDURE — XW033E5 INTRODUCTION OF REMDESIVIR ANTI-INFECTIVE INTO PERIPHERAL VEIN, PERCUTANEOUS APPROACH, NEW TECHNOLOGY GROUP 5: ICD-10-PCS | Performed by: STUDENT IN AN ORGANIZED HEALTH CARE EDUCATION/TRAINING PROGRAM

## 2023-11-07 PROCEDURE — 86705 HEP B CORE ANTIBODY IGM: CPT | Mod: PORLAB | Performed by: FAMILY MEDICINE

## 2023-11-07 PROCEDURE — C9113 INJ PANTOPRAZOLE SODIUM, VIA: HCPCS | Performed by: PHYSICIAN ASSISTANT

## 2023-11-07 PROCEDURE — 80074 ACUTE HEPATITIS PANEL: CPT | Mod: PORLAB | Performed by: FAMILY MEDICINE

## 2023-11-07 PROCEDURE — 86738 MYCOPLASMA ANTIBODY: CPT | Performed by: FAMILY MEDICINE

## 2023-11-07 PROCEDURE — 84132 ASSAY OF SERUM POTASSIUM: CPT | Performed by: PHYSICIAN ASSISTANT

## 2023-11-07 PROCEDURE — 87389 HIV-1 AG W/HIV-1&-2 AB AG IA: CPT | Mod: PORLAB | Performed by: FAMILY MEDICINE

## 2023-11-07 PROCEDURE — 87640 STAPH A DNA AMP PROBE: CPT | Performed by: PHYSICIAN ASSISTANT

## 2023-11-07 PROCEDURE — 87641 MR-STAPH DNA AMP PROBE: CPT | Performed by: PHYSICIAN ASSISTANT

## 2023-11-07 PROCEDURE — 99233 SBSQ HOSP IP/OBS HIGH 50: CPT | Performed by: INTERNAL MEDICINE

## 2023-11-07 PROCEDURE — 85018 HEMOGLOBIN: CPT | Performed by: PHYSICIAN ASSISTANT

## 2023-11-07 RX ADMIN — PANTOPRAZOLE SODIUM 40 MG: 40 INJECTION, POWDER, FOR SOLUTION INTRAVENOUS at 07:35

## 2023-11-07 RX ADMIN — PIPERACILLIN SODIUM AND TAZOBACTAM SODIUM 3.38 G: 3; .375 INJECTION, SOLUTION INTRAVENOUS at 05:44

## 2023-11-07 RX ADMIN — HEPARIN SODIUM 5000 UNITS: 5000 INJECTION INTRAVENOUS; SUBCUTANEOUS at 07:35

## 2023-11-07 RX ADMIN — HEPARIN SODIUM 5000 UNITS: 5000 INJECTION INTRAVENOUS; SUBCUTANEOUS at 14:56

## 2023-11-07 RX ADMIN — HEPARIN SODIUM 5000 UNITS: 5000 INJECTION INTRAVENOUS; SUBCUTANEOUS at 00:46

## 2023-11-07 RX ADMIN — PIPERACILLIN SODIUM AND TAZOBACTAM SODIUM 3.38 G: 3; .375 INJECTION, SOLUTION INTRAVENOUS at 16:22

## 2023-11-07 RX ADMIN — DEXAMETHASONE 6 MG: 6 TABLET ORAL at 20:53

## 2023-11-07 RX ADMIN — POLYETHYLENE GLYCOL 3350 17 G: 17 POWDER, FOR SOLUTION ORAL at 10:42

## 2023-11-07 RX ADMIN — VANCOMYCIN HYDROCHLORIDE 2000 MG: 1 INJECTION, POWDER, LYOPHILIZED, FOR SOLUTION INTRAVENOUS at 00:46

## 2023-11-07 RX ADMIN — REMDESIVIR 200 MG: 100 INJECTION, POWDER, LYOPHILIZED, FOR SOLUTION INTRAVENOUS at 12:21

## 2023-11-07 RX ADMIN — HEPARIN SODIUM 5000 UNITS: 5000 INJECTION INTRAVENOUS; SUBCUTANEOUS at 23:50

## 2023-11-07 RX ADMIN — PIPERACILLIN SODIUM AND TAZOBACTAM SODIUM 3.38 G: 3; .375 INJECTION, SOLUTION INTRAVENOUS at 23:50

## 2023-11-07 RX ADMIN — DOXYCYCLINE 100 MG: 100 INJECTION, POWDER, LYOPHILIZED, FOR SOLUTION INTRAVENOUS at 20:54

## 2023-11-07 RX ADMIN — PIPERACILLIN SODIUM AND TAZOBACTAM SODIUM 3.38 G: 3; .375 INJECTION, SOLUTION INTRAVENOUS at 00:00

## 2023-11-07 RX ADMIN — PIPERACILLIN SODIUM AND TAZOBACTAM SODIUM 3.38 G: 3; .375 INJECTION, SOLUTION INTRAVENOUS at 11:41

## 2023-11-07 RX ADMIN — DOXYCYCLINE 100 MG: 100 INJECTION, POWDER, LYOPHILIZED, FOR SOLUTION INTRAVENOUS at 10:41

## 2023-11-07 SDOH — SOCIAL STABILITY: SOCIAL INSECURITY: HAS ANYONE EVER THREATENED TO HURT YOUR FAMILY OR YOUR PETS?: NO

## 2023-11-07 SDOH — SOCIAL STABILITY: SOCIAL INSECURITY: DO YOU FEEL ANYONE HAS EXPLOITED OR TAKEN ADVANTAGE OF YOU FINANCIALLY OR OF YOUR PERSONAL PROPERTY?: NO

## 2023-11-07 SDOH — SOCIAL STABILITY: SOCIAL INSECURITY: ABUSE: ADULT

## 2023-11-07 SDOH — SOCIAL STABILITY: SOCIAL INSECURITY: ARE THERE ANY APPARENT SIGNS OF INJURIES/BEHAVIORS THAT COULD BE RELATED TO ABUSE/NEGLECT?: NO

## 2023-11-07 SDOH — SOCIAL STABILITY: SOCIAL INSECURITY: DO YOU FEEL UNSAFE GOING BACK TO THE PLACE WHERE YOU ARE LIVING?: NO

## 2023-11-07 SDOH — SOCIAL STABILITY: SOCIAL INSECURITY: HAVE YOU HAD THOUGHTS OF HARMING ANYONE ELSE?: NO

## 2023-11-07 SDOH — SOCIAL STABILITY: SOCIAL INSECURITY: WERE YOU ABLE TO COMPLETE ALL THE BEHAVIORAL HEALTH SCREENINGS?: YES

## 2023-11-07 SDOH — SOCIAL STABILITY: SOCIAL INSECURITY: ARE YOU OR HAVE YOU BEEN THREATENED OR ABUSED PHYSICALLY, EMOTIONALLY, OR SEXUALLY BY ANYONE?: NO

## 2023-11-07 SDOH — SOCIAL STABILITY: SOCIAL INSECURITY: DOES ANYONE TRY TO KEEP YOU FROM HAVING/CONTACTING OTHER FRIENDS OR DOING THINGS OUTSIDE YOUR HOME?: NO

## 2023-11-07 ASSESSMENT — ACTIVITIES OF DAILY LIVING (ADL)
ADEQUATE_TO_COMPLETE_ADL: YES
FEEDING YOURSELF: INDEPENDENT
HEARING - LEFT EAR: FUNCTIONAL
LACK_OF_TRANSPORTATION: NO
BATHING: INDEPENDENT
TOILETING: INDEPENDENT
HEARING - RIGHT EAR: FUNCTIONAL
PATIENT'S MEMORY ADEQUATE TO SAFELY COMPLETE DAILY ACTIVITIES?: YES
DRESSING YOURSELF: INDEPENDENT
GROOMING: INDEPENDENT
JUDGMENT_ADEQUATE_SAFELY_COMPLETE_DAILY_ACTIVITIES: YES
WALKS IN HOME: INDEPENDENT

## 2023-11-07 ASSESSMENT — COGNITIVE AND FUNCTIONAL STATUS - GENERAL
PATIENT BASELINE BEDBOUND: NO
DAILY ACTIVITIY SCORE: 24
MOBILITY SCORE: 24

## 2023-11-07 ASSESSMENT — PATIENT HEALTH QUESTIONNAIRE - PHQ9
SUM OF ALL RESPONSES TO PHQ9 QUESTIONS 1 & 2: 0
1. LITTLE INTEREST OR PLEASURE IN DOING THINGS: NOT AT ALL
2. FEELING DOWN, DEPRESSED OR HOPELESS: NOT AT ALL

## 2023-11-07 ASSESSMENT — LIFESTYLE VARIABLES
AUDIT-C TOTAL SCORE: 0
AUDIT-C TOTAL SCORE: 0
HOW OFTEN DO YOU HAVE A DRINK CONTAINING ALCOHOL: NEVER
SKIP TO QUESTIONS 9-10: 1
HOW MANY STANDARD DRINKS CONTAINING ALCOHOL DO YOU HAVE ON A TYPICAL DAY: PATIENT DOES NOT DRINK
HOW OFTEN DO YOU HAVE 6 OR MORE DRINKS ON ONE OCCASION: NEVER

## 2023-11-07 ASSESSMENT — PAIN - FUNCTIONAL ASSESSMENT: PAIN_FUNCTIONAL_ASSESSMENT: 0-10

## 2023-11-07 ASSESSMENT — PAIN SCALES - GENERAL
PAINLEVEL_OUTOF10: 0 - NO PAIN
PAINLEVEL_OUTOF10: 0 - NO PAIN

## 2023-11-07 NOTE — PROGRESS NOTES
Vancomycin Dosing by Pharmacy- Cessation of Therapy    Consult to pharmacy for vancomycin dosing has been discontinued by the prescriber for negative MRSA PCR, pharmacy will sign off at this time.    Please call pharmacy if there are further questions or re-enter a consult if vancomycin is resumed.     Coni Perales, PharmD

## 2023-11-07 NOTE — H&P
History Of Present Illness  Laura Vences is a 63 y.o. female with PMH of HTN, thoracic aortic aneurysm, tobacco use disorder, depression, methamphetamine use, who presents with shortness of breath, fever, lethargy.  Patient significantly lethargic and not answering most questions, majority of history obtained by family member at bedside and ED provider.  She reportedly had been having symptoms for the past several days.  Called EMS today due to worsening shortness of breath.  Patient unable to tell me if she has been febrile, does not provide further details regarding associated symptoms.  Family states she has not been vaccinated against COVID, states she may have had COVID previously 1 time.  No known history of prior lung disease.  Further history unable to be obtained at this time    ED course: Afebrile, , RR 32, /90, pulse ox 93% on 4 L NC (documented as room air however upon discussion with nursing staff was brought in on 4 L by EMS).  No leukocytosis, Hgb 14.9. INR 1.2, D-Dimer 1176. Chemistries remarkable for CRP 1.24. pH 7.43, pCO2 38. COVID positive. CT chest negative for PE, does show airspace opacities in bilateral upper lobes and lung bases more prominent on right concerning for infection. Small right>left pleural effusions, stable 4.2 dilation of ascending thoracic aorta. Given 6mg IV dexamethasone, started on zosyn. During stay in ED patient became creasingly hypoxic, titrated up to 6 L NC.  Patient noted to be mouth breathing and oxygen levels were dropping thus she was started on nonrebreather.  Decision made for admission    Past Medical History  She has no past medical history on file.    Surgical History  She has no past surgical history on file.     Social History  She has no history on file for tobacco use, alcohol use, and drug use.    Family History  No family history on file.     Allergies  Patient has no known allergies.    Review of Systems  12 point ROS reviewed, negative  except for as noted above    Last Recorded Vitals  /82   Pulse 90   Temp 36.7 °C (98.1 °F) (Tympanic)   Resp (!) 28   Wt 90.7 kg (199 lb 15.3 oz)   SpO2 100%       Physical Exam  Constitutional: Well developed, well nourished, tachypneic, diaphoretic, lethargic and does not answer most questions    Eyes: PERRL, EOMI    Head/Neck: Normocephalic, atraumatic. Neck supple, no thyromegaly, JVD. Trachea midline    Respiratory/Thorax: Tachypneic with shallow breathing.  Breath sounds diminished bilaterally    Cardiovascular: RRR, no murmurs, rubs, or gallops noted. Peripheral pulses 2+    Gastrointestinal: Bowel sounds normal. Abdomen soft, nontender, nondistended, with no palpable masses    Musculoskeletal: No gross deformities. No gross muscular weakness with no ROM limitation    Extremities: No lower extremity edema noted bilaterally    Neurological: Lethargic, does not answer orientation questions    Skin: No rashes or lesions noted.         Relevant Results  Results for orders placed or performed during the hospital encounter of 11/06/23 (from the past 24 hour(s))   CBC and Auto Differential   Result Value Ref Range    WBC 8.5 4.4 - 11.3 x10*3/uL    nRBC 0.0 0.0 - 0.0 /100 WBCs    RBC 4.89 4.00 - 5.20 x10*6/uL    Hemoglobin 14.9 12.0 - 16.0 g/dL    Hematocrit 43.2 36.0 - 46.0 %    MCV 88 80 - 100 fL    MCH 30.5 26.0 - 34.0 pg    MCHC 34.5 32.0 - 36.0 g/dL    RDW 13.8 11.5 - 14.5 %    Platelets 142 (L) 150 - 450 x10*3/uL    Neutrophils % 84.0 40.0 - 80.0 %    Immature Granulocytes %, Automated 0.5 0.0 - 0.9 %    Lymphocytes % 7.9 13.0 - 44.0 %    Monocytes % 7.1 2.0 - 10.0 %    Eosinophils % 0.1 0.0 - 6.0 %    Basophils % 0.4 0.0 - 2.0 %    Neutrophils Absolute 7.16 1.20 - 7.70 x10*3/uL    Immature Granulocytes Absolute, Automated 0.04 0.00 - 0.70 x10*3/uL    Lymphocytes Absolute 0.67 (L) 1.20 - 4.80 x10*3/uL    Monocytes Absolute 0.60 0.10 - 1.00 x10*3/uL    Eosinophils Absolute 0.01 0.00 - 0.70 x10*3/uL     Basophils Absolute 0.03 0.00 - 0.10 x10*3/uL   Comprehensive metabolic panel   Result Value Ref Range    Glucose 102 (H) 74 - 99 mg/dL    Sodium 134 (L) 136 - 145 mmol/L    Potassium 3.7 3.5 - 5.3 mmol/L    Chloride 101 98 - 107 mmol/L    Bicarbonate 22 21 - 32 mmol/L    Anion Gap 15 10 - 20 mmol/L    Urea Nitrogen 14 6 - 23 mg/dL    Creatinine 0.66 0.50 - 1.05 mg/dL    eGFR >90 >60 mL/min/1.73m*2    Calcium 8.8 8.6 - 10.3 mg/dL    Albumin 3.9 3.4 - 5.0 g/dL    Alkaline Phosphatase 91 33 - 136 U/L    Total Protein 7.3 6.4 - 8.2 g/dL    AST 42 (H) 9 - 39 U/L    Bilirubin, Total 0.7 0.0 - 1.2 mg/dL    ALT 53 (H) 7 - 45 U/L   D-Dimer, Quantitative Non VTE   Result Value Ref Range    D-Dimer Non VTE, Quant (ng/mL FEU) 1,763 (H) <=500 ng/mL FEU   Protime-INR   Result Value Ref Range    Protime 13.0 (H) 9.8 - 12.8 seconds    INR 1.2 (H) 0.9 - 1.1   C-Reactive Protein   Result Value Ref Range    C-Reactive Protein 1.24 (H) <1.00 mg/dL   Blood Gas Venous Full Panel   Result Value Ref Range    POCT pH, Venous 7.43 7.33 - 7.43 pH    POCT pCO2, Venous 38 (L) 41 - 51 mm Hg    POCT pO2, Venous 43 35 - 45 mm Hg    POCT SO2, Venous 71 45 - 75 %    POCT Oxy Hemoglobin, Venous 70.1 45.0 - 75.0 %    POCT Hematocrit Calculated, Venous 46.0 36.0 - 46.0 %    POCT Sodium, Venous 132 (L) 136 - 145 mmol/L    POCT Potassium, Venous 3.8 3.5 - 5.3 mmol/L    POCT Chloride, Venous 100 98 - 107 mmol/L    POCT Ionized Calicum, Venous 1.13 1.10 - 1.33 mmol/L    POCT Glucose, Venous 110 (H) 74 - 99 mg/dL    POCT Lactate, Venous 1.2 0.4 - 2.0 mmol/L    POCT Base Excess, Venous 1.0 -2.0 - 3.0 mmol/L    POCT HCO3 Calculated, Venous 25.2 22.0 - 26.0 mmol/L    POCT Hemoglobin, Venous 15.4 12.0 - 16.0 g/dL    POCT Anion Gap, Venous 11.0 10.0 - 25.0 mmol/L    Patient Temperature 37.0 degrees Celsius    FiO2 21 %   Ferritin   Result Value Ref Range    Ferritin 57 8 - 150 ng/mL   Sars-CoV-2 PCR, Symptomatic   Result Value Ref Range    Coronavirus 2019, PCR  Detected (A) Not Detected      CT angio chest for pulmonary embolism    Result Date: 11/6/2023  Interpreted By:  Lorenzo Bangura, STUDY: CT ANGIO CHEST FOR PULMONARY EMBOLISM;  11/6/2023 10:19 pm   INDICATION: Signs/Symptoms:shortness of breath, hypoxia, covid positive, elevated d dimer.   COMPARISON: CT chest 06/15/2021   ACCESSION NUMBER(S): [id number]   ORDERING CLINICIAN: SHASHI CORREA   TECHNIQUE: Helical data acquisition of the chest was obtained after administration of 75ml IV Omnipaque 350. Images were reformatted in coronal and sagittal planes. Axial and coronal MIP images were created and reviewed.   FINDINGS: POTENTIAL LIMITATIONS OF THE STUDY: Motion   HEART AND VESSELS: No discrete filling defects within the main pulmonary artery or its branches.   Main pulmonary artery and its branches are normal in caliber.   Stable 4.2 cm aneurysmal dilation ascending thoracic aorta with mild atherosclerotic calcifications.   Moderate coronary artery calcifications are seen.The study is not optimized for evaluation of coronary arteries.   Mild cardiomegaly.   No evidence of pericardial effusion.   MEDIASTINUM AND DWAIN, LOWER NECK AND AXILLA: The visualized thyroid gland is within normal limits.   No evidence of thoracic lymphadenopathy by CT criteria.   Esophagus appears within normal limits as seen.   LUNGS AND AIRWAYS: Central airways are patent without endobronchial lesions.Mild bilateral bronchial thickening in the lower lung zones. Patchy airspace opacities in the lung bases, and upper lobes more prominent on the right, concerning for an infectious/inflammatory process such as pneumonia. Trace bilateral pleural effusions. Within the limits of evaluation, there are no suspicious pulmonary nodules. No pneumothorax.   UPPER ABDOMEN: The visualized subdiaphragmatic structures demonstrate no remarkable findings.   CHEST WALL AND OSSEOUS STRUCTURES: There are no suspicious osseous lesions. Multilevel  degenerative changes are present       Examination is degraded by motion. 1. No evidence of pulmonary embolism. 2. Airspace opacities in the bilateral upper lobes and lung bases more prominent on the right concerning for an infectious/inflammatory process such as pneumonia. 3. Small right-greater-than-left pleural effusions with atelectasis. 4. Stable 4.2 cm aneurysmal dilation ascending thoracic aorta.     Signed by: Lorenzo Bangura 11/6/2023 10:40 PM Dictation workstation:   [id number]    XR chest 1 view    Result Date: 11/6/2023  Interpreted By:  Shelley Zamora, STUDY: Chest, single AP view.   INDICATION: Signs/Symptoms:shortness of breath.   COMPARISON: Chest radiograph 06/14/2021. CT chest 06/15/2021.   ACCESSION NUMBER(S): [id number]   ORDERING CLINICIAN: SHASHI CORREA   FINDINGS: The cardiac silhouette size is within normal limits. No pneumothorax. No sizeable pleural effusion.   Prominent interstitial opacities are visualized in the bilateral mid and lower lung zones. Somewhat confluent appearing opacity in the right lower lung infrahilar region.   No acute osseous abnormality.       1. Prominent interstitial opacities in the bilateral mid and lower lung zones with superimposed confluent appearing opacity in the right lower lung zone which may represent pulmonary edema with superimposed pneumonia of the right lung base not excluded. Correlate with concern for fluid overload versus pneumonia. Follow-up radiographs are suggested in 2-3 weeks to document resolution as well.   MACRO: None.   Signed by: Shelley Zamora 11/6/2023 9:51 PM Dictation workstation:   [id number]     Scheduled medications:  dexAMETHasone, 6 mg, oral, q24h  heparin (porcine), 5,000 Units, subcutaneous, q8h  pantoprazole, 40 mg, oral, Daily before breakfast   Or  pantoprazole, 40 mg, intravenous, Daily before breakfast  piperacillin-tazobactam, 3.375 g, intravenous, q6h  polyethylene glycol, 17 g, oral, Daily  vancomycin,  2,000 mg, intravenous, Once  vancomycin, 1,250 mg, intravenous, q12h      Continuous medications:     PRN medications:       Assessment and Plan  Acute hypoxic respiratory failure (CMS/HCC)  Assessment & Plan  2/2 COVID. Concern for superimposed bacterial pneumonia as well, continue on vanc/zosyn. Procalcitonin pending. Supplemental oxygen as needed, wean as tolerated. Continue to monitor closely     * COVID-19 virus infection  Assessment & Plan  Continue on dexamethsone, PRN albuterol. Trend inflammatory markers. Supplemental oxygen as needed. ID consulted, appreciate further recs. Continue to monitor     Methamphetamine use (CMS/[facility])  Assessment & Plan  Per family.  U tox pending    Thoracic aortic aneurysm (CMS/[facility])  Assessment & Plan  Stable on imaging today. Continue to monitor    Tobacco use disorder  Assessment & Plan  Smoking cessation education    Class 1 obesity with body mass index (BMI) of 31.0 to 31.9 in adult  Assessment & Plan  Evidenced by BMI 31.32       DVT ppx    SCDs, SQ Heparin     Brian Geronimo PA-C

## 2023-11-07 NOTE — CARE PLAN
The patient's goals for the shift include      The clinical goals for the shift include pox > 93%    Over the shift, the patient did not make progress toward the following goals. Barriers to progression include lethargy, covid, pleural effusions. Recommendations to address these barriers include monitor oxygen levels and wean as tolerated.

## 2023-11-07 NOTE — ED PROVIDER NOTES
HPI   Chief Complaint   Patient presents with    Shortness of Breath       Patient presents to the emergency department secondary to shortness of breath, fever and lethargy.  Patient was found to be COVID-positive today.  She had 2 days of symptoms.  She arrives from home.  She has received a COVID-vaccine but probably has not received all of the booster shots.  She does smoke a pack and a half a day.  She has had increased urination since symptoms started.  She had decreased oral intake.  No diarrhea.  She has diffuse body pain.  Mild chest discomfort.  Patient is mildly lethargic and has difficulty answering all questions.                          No data recorded                Patient History   History reviewed. No pertinent past medical history.  No past surgical history on file.  No family history on file.  Social History     Tobacco Use    Smoking status: Not on file    Smokeless tobacco: Not on file   Substance Use Topics    Alcohol use: Not on file    Drug use: Not on file       Physical Exam   ED Triage Vitals [11/06/23 2003]   Temp Heart Rate Resp BP   37.8 °C (100.1 °F) (!) 114 (!) 32 138/90      SpO2 Temp Source Heart Rate Source Patient Position   93 % Temporal Monitor --      BP Location FiO2 (%)     -- --       Physical Exam  Vitals and nursing note reviewed.   Constitutional:       Appearance: Normal appearance. She is ill-appearing.   HENT:      Head: Normocephalic and atraumatic.      Right Ear: Tympanic membrane normal.      Left Ear: Tympanic membrane normal.      Nose: Nose normal.      Mouth/Throat:      Comments: Mucous membranes are dry.  Eyes:      Extraocular Movements: Extraocular movements intact.      Pupils: Pupils are equal, round, and reactive to light.   Cardiovascular:      Rate and Rhythm: Regular rhythm. Tachycardia present.      Pulses: Normal pulses.      Heart sounds: Normal heart sounds.   Pulmonary:      Effort: Tachypnea present.      Breath sounds: Examination of the  right-lower field reveals decreased breath sounds. Examination of the left-lower field reveals decreased breath sounds. Decreased breath sounds present.   Chest:      Chest wall: No tenderness or crepitus.   Abdominal:      General: Abdomen is flat. Bowel sounds are normal.      Palpations: Abdomen is soft.   Musculoskeletal:         General: Normal range of motion.      Cervical back: Normal range of motion.   Skin:     General: Skin is warm and dry.      Capillary Refill: Capillary refill takes less than 2 seconds.   Neurological:      General: No focal deficit present.      Mental Status: She is oriented to person, place, and time.      Comments: Patient has general weakness.  No focal neurologic deficits.   Psychiatric:         Mood and Affect: Mood normal.         Behavior: Behavior normal.       Labs Reviewed   CBC WITH AUTO DIFFERENTIAL - Abnormal       Result Value    WBC 8.5      nRBC 0.0      RBC 4.89      Hemoglobin 14.9      Hematocrit 43.2      MCV 88      MCH 30.5      MCHC 34.5      RDW 13.8      Platelets 142 (*)     Neutrophils % 84.0      Immature Granulocytes %, Automated 0.5      Lymphocytes % 7.9      Monocytes % 7.1      Eosinophils % 0.1      Basophils % 0.4      Neutrophils Absolute 7.16      Immature Granulocytes Absolute, Automated 0.04      Lymphocytes Absolute 0.67 (*)     Monocytes Absolute 0.60      Eosinophils Absolute 0.01      Basophils Absolute 0.03     COMPREHENSIVE METABOLIC PANEL - Abnormal    Glucose 102 (*)     Sodium 134 (*)     Potassium 3.7      Chloride 101      Bicarbonate 22      Anion Gap 15      Urea Nitrogen 14      Creatinine 0.66      eGFR >90      Calcium 8.8      Albumin 3.9      Alkaline Phosphatase 91      Total Protein 7.3      AST 42 (*)     Bilirubin, Total 0.7      ALT 53 (*)    D-DIMER, NON VTE - Abnormal    D-Dimer Non VTE, Quant (ng/mL FEU) 1,763 (*)     Narrative:     The D-Dimer assay is reported in ng/mL Fibrinogen Equivalent Units (FEU). The results of  this assay should NOT be used for the exclusion of Deep Vein Thrombosis and/or Pulmonary Embolism.   PROTIME-INR - Abnormal    Protime 13.0 (*)     INR 1.2 (*)    C-REACTIVE PROTEIN - Abnormal    C-Reactive Protein 1.24 (*)    BLOOD GAS VENOUS FULL PANEL - Abnormal    POCT pH, Venous 7.43      POCT pCO2, Venous 38 (*)     POCT pO2, Venous 43      POCT SO2, Venous 71      POCT Oxy Hemoglobin, Venous 70.1      POCT Hematocrit Calculated, Venous 46.0      POCT Sodium, Venous 132 (*)     POCT Potassium, Venous 3.8      POCT Chloride, Venous 100      POCT Ionized Calicum, Venous 1.13      POCT Glucose, Venous 110 (*)     POCT Lactate, Venous 1.2      POCT Base Excess, Venous 1.0      POCT HCO3 Calculated, Venous 25.2      POCT Hemoglobin, Venous 15.4      POCT Anion Gap, Venous 11.0      Patient Temperature 37.0      FiO2 21     SARS-COV-2 PCR, SYMPTOMATIC - Abnormal    Coronavirus 2019, PCR Detected (*)     Narrative:     This assay has received FDA Emergency Use Authorization (EUA) and is only authorized for the duration of time that circumstances exist to justify the authorization of the emergency use of in vitro diagnostic tests for the detection of SARS-CoV-2 virus and/or diagnosis of COVID-19 infection under section 564(b)(1) of the Act, 21 U.S.C. 360bbb-3(b)(1). This assay is an in vitro diagnostic nucleic acid amplification test for the qualitative detection of SARS-CoV-2 from nasopharyngeal specimens and has been validated for use at [facility]. Negative results do not preclude COVID-19 infections and should not be used as the sole basis for diagnosis, treatment, or other management decisions.     FERRITIN - Normal    Ferritin 57     BLOOD CULTURE   BLOOD CULTURE     Pain Management Panel           No data to display              CT angio chest for pulmonary embolism   Final Result   Examination is degraded by motion.   1. No evidence of pulmonary embolism.   2. Airspace opacities in the  bilateral upper lobes and lung bases   more prominent on the right concerning for an infectious/inflammatory   process such as pneumonia.   3. Small right-greater-than-left pleural effusions with atelectasis.   4. Stable 4.2 cm aneurysmal dilation ascending thoracic aorta.             Signed by: Lorenzo Bangura 11/6/2023 10:40 PM   Dictation workstation:   [id number]      XR chest 1 view   Final Result   1. Prominent interstitial opacities in the bilateral mid and lower   lung zones with superimposed confluent appearing opacity in the right   lower lung zone which may represent pulmonary edema with superimposed   pneumonia of the right lung base not excluded. Correlate with concern   for fluid overload versus pneumonia. Follow-up radiographs are   suggested in 2-3 weeks to document resolution as well.        MACRO:   None.        Signed by: Shelley Zamora 11/6/2023 9:51 PM   Dictation workstation:   [id number]        ED Course & MDM   Diagnoses as of 11/06/23 2322   COVID-19 virus infection   Hypoxia       Medical Decision Making  Patient presents with COVID symptoms and was COVID-positive at home.  Symptoms started 2 days ago.  Patient is evaluated for pneumonia with chest x-ray.  Laboratory work-up is obtained.  Patient is given IV fluids and cardiac monitor is placed.  Patient's chest x-ray shows bilateral infiltrates.  CT angiogram shows no evidence of PE but does have persistent bilateral infiltrates with pleural effusions.  Patient's heart rate improved after IV fluids into the 90s.  Blood pressure has been stable.  Patient has had increased oxygen requirement since she has been here.  She is able to maintain her O2 saturation on 6 L but at times does have increased mouth breathing and desats into the 80s.  With coaching she comes back up but then falls asleep and desaturates again.  Because of this she was placed on a nonrebreather to maintain her O2 saturation.  White blood cell counts within normal  limits.  D-dimer was elevated but CT angiogram showed no PE.  Patient has evidence of significant COVID-19 infection with hypoxia.  Blood cultures and broad-spectrum antibiotics were given secondary to concern for possible bacterial pneumonia on top of COVID-19 infection.  Patient was discussed with Brian the physician assistant on for the hospitalist group and plan is for admission to the stepdown unit for further monitoring and management.        Procedure  ECG 12 lead    Performed by: Niecy Alexander MD  Authorized by: Niecy Alexander MD    Interpretation:     Details:  EKG was obtained at 8:23 PM.  It is sinus tachycardia rate of 112.  There are nonspecific ST changes with LVH changes.  No acute ST elevation.  CO interval is 148 and QTc is 460.       Niecy Alexander MD  11/06/23 [id number]

## 2023-11-07 NOTE — PROGRESS NOTES
Laura Vences is a 63 y.o. female on day 0 of admission presenting with COVID-19 virus infection.      Subjective   No acute events overnight.  History and chart reviewed.  Patient seen and examined.  Patient admitted with shortness of breath and found to have COVID-19 pneumonia with respiratory failure.  She was admitted to the ICU as a stepdown overflow.  Patient remains short of breath with dry cough.  Denies any chest pain, fevers or chills.  ID recommendation appreciated.  Continue remdesivir, broad-spectrum antibiotics and Decadron.  Patient require at least another 24 to 48 hours of inpatient service.       Objective     Last Recorded Vitals  /88   Pulse 83   Temp 37.2 °C (99 °F)   Resp (!) 28   Wt 90.7 kg (199 lb 15.3 oz)   SpO2 100%   Intake/Output last 3 Shifts:    Intake/Output Summary (Last 24 hours) at 11/7/2023 1643  Last data filed at 11/7/2023 0614  Gross per 24 hour   Intake 1550 ml   Output --   Net 1550 ml       Admission Weight  Weight: 90.7 kg (199 lb 15.3 oz) (11/06/23 2003)    Daily Weight  11/07/23 : 90.7 kg (199 lb 15.3 oz)    Image Results  CT angio chest for pulmonary embolism  Narrative: Interpreted By:  Lorenzo Bangura,   STUDY:  CT ANGIO CHEST FOR PULMONARY EMBOLISM;  11/6/2023 10:19 pm      INDICATION:  Signs/Symptoms:shortness of breath, hypoxia, covid positive, elevated  d dimer.      COMPARISON:  CT chest 06/15/2021      ACCESSION NUMBER(S):  [id number]      ORDERING CLINICIAN:  SHASHI CORREA      TECHNIQUE:  Helical data acquisition of the chest was obtained after  administration of 75ml IV Omnipaque 350. Images were reformatted in  coronal and sagittal planes. Axial and coronal MIP images were  created and reviewed.      FINDINGS:  POTENTIAL LIMITATIONS OF THE STUDY: Motion      HEART AND VESSELS:  No discrete filling defects within the main pulmonary artery or its  branches.      Main pulmonary artery and its branches are normal in caliber.      Stable 4.2 cm  aneurysmal dilation ascending thoracic aorta with mild  atherosclerotic calcifications.      Moderate coronary artery calcifications are seen.The study is not  optimized for evaluation of coronary arteries.      Mild cardiomegaly.      No evidence of pericardial effusion.      MEDIASTINUM AND DWAIN, LOWER NECK AND AXILLA:  The visualized thyroid gland is within normal limits.      No evidence of thoracic lymphadenopathy by CT criteria.      Esophagus appears within normal limits as seen.      LUNGS AND AIRWAYS:  Central airways are patent without endobronchial lesions.Mild  bilateral bronchial thickening in the lower lung zones. Patchy  airspace opacities in the lung bases, and upper lobes more prominent  on the right, concerning for an infectious/inflammatory process such  as pneumonia. Trace bilateral pleural effusions. Within the limits of  evaluation, there are no suspicious pulmonary nodules. No  pneumothorax.      UPPER ABDOMEN:  The visualized subdiaphragmatic structures demonstrate no remarkable  findings.      CHEST WALL AND OSSEOUS STRUCTURES:  There are no suspicious osseous lesions. Multilevel degenerative  changes are present      Impression: Examination is degraded by motion.  1. No evidence of pulmonary embolism.  2. Airspace opacities in the bilateral upper lobes and lung bases  more prominent on the right concerning for an infectious/inflammatory  process such as pneumonia.  3. Small right-greater-than-left pleural effusions with atelectasis.  4. Stable 4.2 cm aneurysmal dilation ascending thoracic aorta.          Signed by: Lorenzo Bangura 11/6/2023 10:40 PM  Dictation workstation:   [id number]  XR chest 1 view  Narrative: Interpreted By:  Shelley Zamora,   STUDY:  Chest, single AP view.      INDICATION:  Signs/Symptoms:shortness of breath.      COMPARISON:  Chest radiograph 06/14/2021. CT chest 06/15/2021.      ACCESSION NUMBER(S):  [id number]      ORDERING CLINICIAN:  SHASHI CORREA       FINDINGS:  The cardiac silhouette size is within normal limits.  No pneumothorax. No sizeable pleural effusion.      Prominent interstitial opacities are visualized in the bilateral mid  and lower lung zones. Somewhat confluent appearing opacity in the  right lower lung infrahilar region.      No acute osseous abnormality.      Impression: 1. Prominent interstitial opacities in the bilateral mid and lower  lung zones with superimposed confluent appearing opacity in the right  lower lung zone which may represent pulmonary edema with superimposed  pneumonia of the right lung base not excluded. Correlate with concern  for fluid overload versus pneumonia. Follow-up radiographs are  suggested in 2-3 weeks to document resolution as well.      MACRO:  None.      Signed by: Shelley Zamora 11/6/2023 9:51 PM  Dictation workstation:   [id number]      Physical Exam  CONSTITUTIONAL - alert, in no acute distress,   SKIN - normal skin color ,warm  HEAD - no trauma, normocephalic  EYES - pupils are equal and reactive to light  CHEST - clear to auscultation, no wheezing, no crackles and no rales, good effort  CARDIAC - regular rate and regular rhythm, no murmur  ABDOMEN - no organomegaly, soft, nontender, nondistended, normal bowel sounds, no guarding/rebound/rigidity  EXTREMITIES - no edema, no deformities  NEUROLOGICAL - alert, oriented x3 and no acute focal signs  PSYCHIATRIC - alert, pleasant and cordial, age-appropriate relevant Results      Results from last 7 days   Lab Units 11/07/23 0441 11/06/23 2013   WBC AUTO x10*3/uL 6.2 8.5   HEMOGLOBIN g/dL 14.9 14.9   HEMATOCRIT % 44.6 43.2   PLATELETS AUTO x10*3/uL 136* 142*   NEUTROS PCT AUTO %  --  84.0   LYMPHS PCT AUTO %  --  7.9   MONOS PCT AUTO %  --  7.1   EOS PCT AUTO %  --  0.1      Results from last 7 days   Lab Units 11/07/23 0441 11/06/23 2013   SODIUM mmol/L 134* 134*   POTASSIUM mmol/L 3.8 3.7   CHLORIDE mmol/L 101 101   CO2 mmol/L 23 22   BUN mg/dL 14 14    CREATININE mg/dL 0.81 0.66   GLUCOSE mg/dL 161* 102*   CALCIUM mg/dL 8.7 8.8                    Assessment/Plan   This patient currently has cardiac telemetry ordered; if you would like to modify or discontinue the telemetry order, click here to go to the orders activity to modify/discontinue the order.              Principal Problem:    COVID-19 virus infection  Active Problems:    Thoracic aortic aneurysm (CMS/HCC)    Acute hypoxic respiratory failure (CMS/HCC)    Class 1 obesity with body mass index (BMI) of 31.0 to 31.9 in adult    Tobacco use disorder    Methamphetamine use (CMS/HCC)    Acute COVID-19 pneumonia with superimposed bacterial pneumonia and acute hypoxic respiratory failure  Continue remdesivir  Continue broad-spectrum antibiotics  Continue Decadron  ID recommendation appreciated.    Methamphetamine use  Thoracic aortic aneurysm  Tobacco use disorder  BMI of 31-31.9              Brenna Campa MD

## 2023-11-07 NOTE — ASSESSMENT & PLAN NOTE
Continue on dexamethsone, PRN albuterol. Trend inflammatory markers. Supplemental oxygen as needed. ID consulted, appreciate further recs. Continue to monitor

## 2023-11-07 NOTE — PROGRESS NOTES
"Vancomycin Dosing by Pharmacy- INITIAL    Laura Vences is a 63 y.o. year old female who Pharmacy has been consulted for vancomycin dosing for pneumonia. Based on the patient's indication and renal status this patient will be dosed based on a goal AUC of 500-600.     Renal function is currently stable.    Visit Vitals  /82   Pulse 90   Temp 36.7 °C (98.1 °F) (Tympanic)   Resp (!) 28        Lab Results   Component Value Date    CREATININE 0.66 11/06/2023    CREATININE 0.85 06/15/2021        Patient weight is No results found for: \"PTWEIGHT\"    No results found for: \"CULTURE\"     No intake/output data recorded.  [unfilled]    Lab Results   Component Value Date    PATIENTTEMP 37.0 11/06/2023          Assessment/Plan     Patient will be given a loading dose of 2000 mg.  Will initiate vancomycin maintenance,  1250 mg every 12 hours.    This dosing regimen is predicted by [employer] to result in the following pharmacokinetic parameters:  Loading dose: 2000 mg at 00:00 11/07/2023.  Regimen: 1250 mg IV every 12 hours.  Start time: 23:47 on 11/06/2023  Exposure target: AUC24 (range)400-600 mg/L.hr   AUC24,ss: 530 mg/L.hr  Probability of AUC24 > 400: 77 %  Ctrough,ss: 16.4 mg/L  Probability of Ctrough,ss > 20: 34 %  Probability of nephrotoxicity (Lodise ASHWIN 2009): 12 %  Follow-up level will be ordered on 11/9 at AM Labs unless clinically indicated sooner.  Will continue to monitor renal function daily while on vancomycin and order serum creatinine at least every 48 hours if not already ordered.  Follow for continued vancomycin needs, clinical response, and signs/symptoms of toxicity.       Coni Perales, PharmD       "

## 2023-11-07 NOTE — ASSESSMENT & PLAN NOTE
2/2 COVID. Concern for superimposed bacterial pneumonia as well, continue on vanc/zosyn. Procalcitonin pending. Supplemental oxygen as needed, wean as tolerated. Continue to monitor closely

## 2023-11-07 NOTE — CONSULTS
Inpatient consult to Infectious Diseases  Consult performed by: Salena Galeano, APRN-CNP  Consult ordered by: Brian Geronimo PA-C      Referred by Brian Geronimo PA-C    Primary MD: No primary care provider on file.    Reason For Consult  COVID, increasing oxygen demands     History Of Present Illness  Laura Vences is a 63 y.o. female with PMH of HTN, thoracic aortic aneurysm, tobacco use disorder, depression, methamphetamine use, who presents with shortness of breath, fever, lethargy. She reportedly had been having symptoms for the past several days.  Called EMS today due to worsening shortness of breath. Remains afebrile. O2 has been increased from 2L to 10L, SpO2 %. Patient resting in bed, NAD. Son at bedside. Patient reports that she works at [employer] [facility] [place] [employer] [facility] [place] and being to develop symptoms x 2 days ago. Patient c/o lower back pain, fatigue, dry non-productive cough and lack of appetite. Denies any further complaints.     Wbc 8.5  Scr 0.66  CRP 1.24 > 4.18  Ddimer 1763 > 1345  Procal: pending     ALP 91 > 81  ALT 53 > 46  AST 42 > 41    COVID-19: positive   MRSA nares PCR: negative   Blood CX: in process     CXR: Prominent interstitial opacities in the bilateral mid and lower lung zones with superimposed confluent appearing opacity in the right lower lung zone which may represent pulmonary edema with superimposed pneumonia of the right lung base not excluded. Correlate with concern for fluid overload versus pneumonia.    CT Chest:  No evidence of pulmonary embolism. Airspace opacities in the bilateral upper lobes and lung bases more prominent on the right concerning for an infectious/inflammatory process such as pneumonia. Small right-greater-than-left pleural effusions with atelectasis. Stable 4.2 cm aneurysmal dilation ascending thoracic aorta.       Past Medical History  She has no past medical history on file.    Surgical History  She has no past surgical history on file.     Social  "History     Occupational History    Not on file   Tobacco Use    Smoking status: Not on file    Smokeless tobacco: Not on file   Substance and Sexual Activity    Alcohol use: Not on file    Drug use: Not on file    Sexual activity: Not on file     Travel History   Travel since 10/07/23    No documented travel since 10/07/23              Family History  No family history on file.  Allergies  Patient has no known allergies.     Immunization History   Administered Date(s) Administered    Moderna SARS-CoV-2 Vaccination 03/11/2021, 04/06/2021, 12/30/2021     Medications  Home medications:  No medications prior to admission.     Current medications:  Scheduled medications  dexAMETHasone, 6 mg, oral, q24h  heparin (porcine), 5,000 Units, subcutaneous, q8h  pantoprazole, 40 mg, oral, Daily before breakfast   Or  pantoprazole, 40 mg, intravenous, Daily before breakfast  piperacillin-tazobactam, 3.375 g, intravenous, q6h  polyethylene glycol, 17 g, oral, Daily      Continuous medications     PRN medications  PRN medications: acetaminophen **OR** acetaminophen **OR** acetaminophen, acetaminophen **OR** acetaminophen **OR** acetaminophen, albuterol, melatonin    Review of Systems   10 point ROS completed, negative unless otherwise mentioned in HPI.    Objective  Range of Vitals (last 24 hours)  Heart Rate:  []   Temp:  [36.6 °C (97.8 °F)-37.8 °C (100.1 °F)]   Resp:  [16-32]   BP: (110-138)/(79-96)   Height:  [170.2 cm (5' 7\")]   Weight:  [90.7 kg (199 lb 15.3 oz)]   SpO2:  [93 %-100 %]   Daily Weight  11/07/23 : 90.7 kg (199 lb 15.3 oz)    Body mass index is 31.32 kg/m².     Physical Exam  GEN: Alert, NAD  HEENT: Moist mucous membranes  NECK: Supple  LUNGS: Diminished   CV: RRR  GI: Soft, NT, ND, BS+  , No nieves, No CVAT  SKIN: Warm, Dry  EXT: No edema, No joint inflammation  PIV: C/D/I    Labs  Results from last 72 hours   Lab Units 11/07/23  0441 11/06/23 2013   WBC AUTO x10*3/uL 6.2 8.5   HEMOGLOBIN g/dL 14.9 14.9 " "  HEMATOCRIT % 44.6 43.2   PLATELETS AUTO x10*3/uL 136* 142*   NEUTROS PCT AUTO %  --  84.0   LYMPHS PCT AUTO %  --  7.9   MONOS PCT AUTO %  --  7.1   EOS PCT AUTO %  --  0.1     Results from last 72 hours   Lab Units 11/07/23 0441 11/06/23 2013   SODIUM mmol/L 134* 134*   POTASSIUM mmol/L 3.8 3.7   CHLORIDE mmol/L 101 101   CO2 mmol/L 23 22   BUN mg/dL 14 14   CREATININE mg/dL 0.81 0.66   GLUCOSE mg/dL 161* 102*   CALCIUM mg/dL 8.7 8.8   ANION GAP mmol/L 14 15   EGFR mL/min/1.73m*2 82 >90     Results from last 72 hours   Lab Units 11/07/23 0441 11/06/23 2013   ALK PHOS U/L 81 91   BILIRUBIN TOTAL mg/dL 0.6 0.7   PROTEIN TOTAL g/dL 7.2 7.3   ALT U/L 46* 53*   AST U/L 41* 42*   ALBUMIN g/dL 3.7 3.9     Estimated Creatinine Clearance: 83.2 mL/min (by C-G formula based on SCr of 0.81 mg/dL).  C-Reactive Protein   Date Value Ref Range Status   11/07/2023 4.18 (H) <1.00 mg/dL Final   11/06/2023 1.24 (H) <1.00 mg/dL Final     No results found for: \"HIV1X2\", \"HIVCONF\", \"MRWDHR4SE\"  No results found for: \"HEPCABINIT\", \"HEPCAB\", \"HCVPCRQUANT\"  Microbiology  No results found for the last 90 days.            Imaging  CT angio chest for pulmonary embolism    Result Date: 11/6/2023  Interpreted By:  Lorenzo Bangura, STUDY: CT ANGIO CHEST FOR PULMONARY EMBOLISM;  11/6/2023 10:19 pm   INDICATION: Signs/Symptoms:shortness of breath, hypoxia, covid positive, elevated d dimer.   COMPARISON: CT chest 06/15/2021   ACCESSION NUMBER(S): [id number]   ORDERING CLINICIAN: SHASHI CORREA   TECHNIQUE: Helical data acquisition of the chest was obtained after administration of 75ml IV Omnipaque 350. Images were reformatted in coronal and sagittal planes. Axial and coronal MIP images were created and reviewed.   FINDINGS: POTENTIAL LIMITATIONS OF THE STUDY: Motion   HEART AND VESSELS: No discrete filling defects within the main pulmonary artery or its branches.   Main pulmonary artery and its branches are normal in caliber.   Stable " 4.2 cm aneurysmal dilation ascending thoracic aorta with mild atherosclerotic calcifications.   Moderate coronary artery calcifications are seen.The study is not optimized for evaluation of coronary arteries.   Mild cardiomegaly.   No evidence of pericardial effusion.   MEDIASTINUM AND DWAIN, LOWER NECK AND AXILLA: The visualized thyroid gland is within normal limits.   No evidence of thoracic lymphadenopathy by CT criteria.   Esophagus appears within normal limits as seen.   LUNGS AND AIRWAYS: Central airways are patent without endobronchial lesions.Mild bilateral bronchial thickening in the lower lung zones. Patchy airspace opacities in the lung bases, and upper lobes more prominent on the right, concerning for an infectious/inflammatory process such as pneumonia. Trace bilateral pleural effusions. Within the limits of evaluation, there are no suspicious pulmonary nodules. No pneumothorax.   UPPER ABDOMEN: The visualized subdiaphragmatic structures demonstrate no remarkable findings.   CHEST WALL AND OSSEOUS STRUCTURES: There are no suspicious osseous lesions. Multilevel degenerative changes are present       Examination is degraded by motion. 1. No evidence of pulmonary embolism. 2. Airspace opacities in the bilateral upper lobes and lung bases more prominent on the right concerning for an infectious/inflammatory process such as pneumonia. 3. Small right-greater-than-left pleural effusions with atelectasis. 4. Stable 4.2 cm aneurysmal dilation ascending thoracic aorta.     Signed by: Lorenzo Bangura 11/6/2023 10:40 PM Dictation workstation:   [id number]    XR chest 1 view    Result Date: 11/6/2023  Interpreted By:  Shelley Zamora, STUDY: Chest, single AP view.   INDICATION: Signs/Symptoms:shortness of breath.   COMPARISON: Chest radiograph 06/14/2021. CT chest 06/15/2021.   ACCESSION NUMBER(S): [id number]   ORDERING CLINICIAN: SHASHI CORREA   FINDINGS: The cardiac silhouette size is within normal  limits. No pneumothorax. No sizeable pleural effusion.   Prominent interstitial opacities are visualized in the bilateral mid and lower lung zones. Somewhat confluent appearing opacity in the right lower lung infrahilar region.   No acute osseous abnormality.       1. Prominent interstitial opacities in the bilateral mid and lower lung zones with superimposed confluent appearing opacity in the right lower lung zone which may represent pulmonary edema with superimposed pneumonia of the right lung base not excluded. Correlate with concern for fluid overload versus pneumonia. Follow-up radiographs are suggested in 2-3 weeks to document resolution as well.   MACRO: None.   Signed by: Shelley Zamora 11/6/2023 9:51 PM Dictation workstation:   [id number]      Assessment/Plan   COVID-19+  Acute Hypoxic Respiratory Failure - on 10L  -Wbc 8.5  -Scr 0.66  -CRP 1.24 > 4.18  -Ddimer 1763 > 1345  -Procal: pending   -ALP 91 > 81  -ALT 53 > 46  -AST 42 > 41  -COVID-19: positive   -MRSA nares PCR: negative   -Blood CX: in process   -CXR: Prominent interstitial opacities in the bilateral mid and lower lung zones with superimposed confluent appearing opacity in the right lower lung zone which may represent pulmonary edema with superimposed pneumonia of the right lung base not excluded. Correlate with concern for fluid overload versus pneumonia.  -CT Chest:  No evidence of pulmonary embolism. Airspace opacities in the bilateral upper lobes and lung bases more prominent on the right concerning for an infectious/inflammatory process such as pneumonia. Small right-greater-than-left pleural effusions with atelectasis. Stable 4.2 cm aneurysmal dilation ascending thoracic aorta.    Thoracic aortic Aneurysm - stable on CT  Methamphetamine Use Disorder   Tobacco Use Disorder   Obesity (BMI 31.32)    Plan:  C/W dexamethasone   C/W Zosyn   -Follow counts while on Zosyn due to risk of cytopenias  Start Remdesivir upto 5 days depending on  clinical course  Start Doxycyline  Ordered CAP AG  Ordered Sputum Cx, collect if able  Ordered HIV  Ordered Hep Panel  Follow Procal   Follow Blood Cx  Monitor CBC , CMP daily   Empiric Antibiotics to cover for possible superimposed bacterial pneumonia  Trend Crp, d-dimer q48 per lab protocol  Evaluate for Baricitinib administration - will obtain consent   Wean oxygen as able  Monitor temperatures and counts  Monitor mentation   Encourage IS use     Plans d/w ID attending, Dr. Hwang and patient/family     Salena [place]lewood, CNP  [facility]   Answering Service [phone]  Date of service: 11/7/2023  Time of service: 9:38 AM

## 2023-11-08 LAB — HIV 1+2 AB+HIV1 P24 AG SERPL QL IA: NONREACTIVE

## 2023-11-08 PROCEDURE — 96372 THER/PROPH/DIAG INJ SC/IM: CPT | Performed by: PHYSICIAN ASSISTANT

## 2023-11-08 PROCEDURE — 2500000004 HC RX 250 GENERAL PHARMACY W/ HCPCS (ALT 636 FOR OP/ED): Performed by: INTERNAL MEDICINE

## 2023-11-08 PROCEDURE — 2500000005 HC RX 250 GENERAL PHARMACY W/O HCPCS: Performed by: FAMILY MEDICINE

## 2023-11-08 PROCEDURE — 2500000004 HC RX 250 GENERAL PHARMACY W/ HCPCS (ALT 636 FOR OP/ED): Performed by: FAMILY MEDICINE

## 2023-11-08 PROCEDURE — 2500000004 HC RX 250 GENERAL PHARMACY W/ HCPCS (ALT 636 FOR OP/ED): Performed by: PHYSICIAN ASSISTANT

## 2023-11-08 PROCEDURE — 96372 THER/PROPH/DIAG INJ SC/IM: CPT | Performed by: INTERNAL MEDICINE

## 2023-11-08 PROCEDURE — S4991 NICOTINE PATCH NONLEGEND: HCPCS | Performed by: INTERNAL MEDICINE

## 2023-11-08 PROCEDURE — 2500000002 HC RX 250 W HCPCS SELF ADMINISTERED DRUGS (ALT 637 FOR MEDICARE OP, ALT 636 FOR OP/ED): Performed by: INTERNAL MEDICINE

## 2023-11-08 PROCEDURE — C9113 INJ PANTOPRAZOLE SODIUM, VIA: HCPCS | Performed by: PHYSICIAN ASSISTANT

## 2023-11-08 PROCEDURE — 99233 SBSQ HOSP IP/OBS HIGH 50: CPT | Performed by: INTERNAL MEDICINE

## 2023-11-08 PROCEDURE — 2500000001 HC RX 250 WO HCPCS SELF ADMINISTERED DRUGS (ALT 637 FOR MEDICARE OP): Performed by: INTERNAL MEDICINE

## 2023-11-08 PROCEDURE — 1210000001 HC SEMI-PRIVATE ROOM DAILY

## 2023-11-08 RX ORDER — IBUPROFEN 200 MG
1 TABLET ORAL DAILY
Status: DISCONTINUED | OUTPATIENT
Start: 2023-11-08 | End: 2023-11-09 | Stop reason: HOSPADM

## 2023-11-08 RX ADMIN — PANTOPRAZOLE SODIUM 40 MG: 40 INJECTION, POWDER, FOR SOLUTION INTRAVENOUS at 06:25

## 2023-11-08 RX ADMIN — PIPERACILLIN SODIUM AND TAZOBACTAM SODIUM 3.38 G: 3; .375 INJECTION, SOLUTION INTRAVENOUS at 17:45

## 2023-11-08 RX ADMIN — ACETAMINOPHEN 650 MG: 325 TABLET ORAL at 08:28

## 2023-11-08 RX ADMIN — DOXYCYCLINE 100 MG: 100 INJECTION, POWDER, LYOPHILIZED, FOR SOLUTION INTRAVENOUS at 20:48

## 2023-11-08 RX ADMIN — NICOTINE 1 PATCH: 14 PATCH, EXTENDED RELEASE TRANSDERMAL at 20:47

## 2023-11-08 RX ADMIN — HEPARIN SODIUM 5000 UNITS: 5000 INJECTION INTRAVENOUS; SUBCUTANEOUS at 17:00

## 2023-11-08 RX ADMIN — HEPARIN SODIUM 5000 UNITS: 5000 INJECTION INTRAVENOUS; SUBCUTANEOUS at 23:01

## 2023-11-08 RX ADMIN — DOXYCYCLINE 100 MG: 100 INJECTION, POWDER, LYOPHILIZED, FOR SOLUTION INTRAVENOUS at 09:33

## 2023-11-08 RX ADMIN — HEPARIN SODIUM 5000 UNITS: 5000 INJECTION INTRAVENOUS; SUBCUTANEOUS at 06:26

## 2023-11-08 RX ADMIN — PIPERACILLIN SODIUM AND TAZOBACTAM SODIUM 3.38 G: 3; .375 INJECTION, SOLUTION INTRAVENOUS at 06:25

## 2023-11-08 RX ADMIN — DEXAMETHASONE 6 MG: 6 TABLET ORAL at 20:47

## 2023-11-08 RX ADMIN — PIPERACILLIN SODIUM AND TAZOBACTAM SODIUM 3.38 G: 3; .375 INJECTION, SOLUTION INTRAVENOUS at 11:55

## 2023-11-08 RX ADMIN — Medication 3 MG: at 23:10

## 2023-11-08 RX ADMIN — Medication 100 MG: at 10:52

## 2023-11-08 RX ADMIN — ACETAMINOPHEN 650 MG: 325 TABLET ORAL at 23:10

## 2023-11-08 RX ADMIN — PIPERACILLIN SODIUM AND TAZOBACTAM SODIUM 3.38 G: 3; .375 INJECTION, SOLUTION INTRAVENOUS at 23:01

## 2023-11-08 ASSESSMENT — COGNITIVE AND FUNCTIONAL STATUS - GENERAL
DAILY ACTIVITIY SCORE: 24
MOBILITY SCORE: 24
MOBILITY SCORE: 24
DAILY ACTIVITIY SCORE: 24

## 2023-11-08 ASSESSMENT — PAIN SCALES - GENERAL
PAINLEVEL_OUTOF10: 0 - NO PAIN
PAINLEVEL_OUTOF10: 3
PAINLEVEL_OUTOF10: 0 - NO PAIN
PAINLEVEL_OUTOF10: 3
PAINLEVEL_OUTOF10: 0 - NO PAIN
PAINLEVEL_OUTOF10: 3

## 2023-11-08 ASSESSMENT — PAIN DESCRIPTION - LOCATION: LOCATION: GENERALIZED

## 2023-11-08 ASSESSMENT — PAIN - FUNCTIONAL ASSESSMENT
PAIN_FUNCTIONAL_ASSESSMENT: 0-10

## 2023-11-08 NOTE — CARE PLAN
Problem: Fall/Injury  Goal: Not fall by end of shift  Outcome: Progressing     Problem: Respiratory  Goal: Clear secretions with interventions this shift  Outcome: Progressing  Goal: Minimal/no exertional discomfort or dyspnea this shift  Outcome: Progressing  Goal: No signs of respiratory distress (eg. Use of accessory muscles. Peds grunting)  Outcome: Progressing     Problem: Safety - Adult  Goal: Free from fall injury  Outcome: Progressing

## 2023-11-08 NOTE — PROGRESS NOTES
Laura Vences is a 63 y.o. female on day 1 of admission presenting with COVID-19 virus infection.    Subjective   Interval History: Patient has been transferred out of ICU.  Weaned to room air, tolerating well, SpO2 %.  No luekocytosis (wbc 6.2), Scr 0.81  Procal 1.44, ddimer 1345, Crp 4.18  HIV non reactive, Hepatitis non reactive   MRSA nares PCR negative   CAP AG in process  Blood Cx in process           Review of Systems  10 point ROS completed, negative unless otherwise mentioned in HPI.    Objective   Range of Vitals (last 24 hours)  Heart Rate:  [75-97]   Temp:  [36 °C (96.8 °F)-37.2 °C (99 °F)]   Resp:  [10-28]   BP: ()/(62-86)   SpO2:  [94 %-100 %]   Daily Weight  11/07/23 : 90.7 kg (199 lb 15.3 oz)    Body mass index is 31.32 kg/m².    Physical Exam  GEN: Alert, NAD  HEENT: Moist mucous membranes  NECK: Supple  LUNGS: Diminished   CV: RRR  GI: Soft, NT, ND, BS+  , No nieves, No CVAT  SKIN: Warm, Dry  EXT: No edema, No joint inflammation  PIV: C/D/I    Antibiotics  sodium chloride 0.9 % bolus 1,000 mL  ondansetron (Zofran) injection 4 mg  acetaminophen (Tylenol) tablet 650 mg  dexAMETHasone (Decadron) injection 6 mg  iohexol (OMNIPaque) 350 mg iodine/mL solution 75 mL  piperacillin-tazobactam-dextrose (Zosyn) IV 3.375 g  albuterol 90 mcg/actuation inhaler 2 puff  pantoprazole (ProtoNix) EC tablet 40 mg  pantoprazole (ProtoNix) injection 40 mg  acetaminophen (Tylenol) tablet 650 mg  acetaminophen (Tylenol) oral liquid 650 mg  acetaminophen (Tylenol) suppository 650 mg  acetaminophen (Tylenol) tablet 650 mg  acetaminophen (Tylenol) oral liquid 650 mg  acetaminophen (Tylenol) suppository 650 mg  melatonin tablet 3 mg  polyethylene glycol (Glycolax, Miralax) packet 17 g  heparin (porcine) injection 5,000 Units  dexAMETHasone (Decadron) tablet 6 mg  piperacillin-tazobactam-dextrose (Zosyn) IV 3.375 g  vancomycin (Vancocin) 2,000 mg in dextrose 5 % in water (D5W) 500 mL IV  vancomycin (Vancocin) in  dextrose 5 % water (D5W) 250 mL IV 1,250 mg  remdesivir (Veklury) 200 mg in sodium chloride 0.9% 250 mL IV  remdesivir (Veklury) 100 mg in sodium chloride 0.9% 250 mL IV  doxycycline (Vibramycin) in sodium chloride 0.9 % 100 mL  mg  oxygen (O2) therapy      Relevant Results  Labs  Results from last 72 hours   Lab Units 11/07/23 0441 11/06/23 2013   WBC AUTO x10*3/uL 6.2 8.5   HEMOGLOBIN g/dL 14.9 14.9   HEMATOCRIT % 44.6 43.2   PLATELETS AUTO x10*3/uL 136* 142*   NEUTROS PCT AUTO %  --  84.0   LYMPHS PCT AUTO %  --  7.9   MONOS PCT AUTO %  --  7.1   EOS PCT AUTO %  --  0.1     Results from last 72 hours   Lab Units 11/07/23 0441 11/06/23 2013   SODIUM mmol/L 134* 134*   POTASSIUM mmol/L 3.8 3.7   CHLORIDE mmol/L 101 101   CO2 mmol/L 23 22   BUN mg/dL 14 14   CREATININE mg/dL 0.81 0.66   GLUCOSE mg/dL 161* 102*   CALCIUM mg/dL 8.7 8.8   ANION GAP mmol/L 14 15   EGFR mL/min/1.73m*2 82 >90     Results from last 72 hours   Lab Units 11/07/23 0441 11/06/23 2013   ALK PHOS U/L 81 91   BILIRUBIN TOTAL mg/dL 0.6 0.7   PROTEIN TOTAL g/dL 7.2 7.3   ALT U/L 46* 53*   AST U/L 41* 42*   ALBUMIN g/dL 3.7 3.9     Estimated Creatinine Clearance: 83.2 mL/min (by C-G formula based on SCr of 0.81 mg/dL).  C-Reactive Protein   Date Value Ref Range Status   11/07/2023 4.18 (H) <1.00 mg/dL Final   11/06/2023 1.24 (H) <1.00 mg/dL Final     Microbiology  No results found for the last 14 days.          Imaging  CT angio chest for pulmonary embolism    Result Date: 11/6/2023  Interpreted By:  Lorenzo Bangura, STUDY: CT ANGIO CHEST FOR PULMONARY EMBOLISM;  11/6/2023 10:19 pm   INDICATION: Signs/Symptoms:shortness of breath, hypoxia, covid positive, elevated d dimer.   COMPARISON: CT chest 06/15/2021   ACCESSION NUMBER(S): [id number]   ORDERING CLINICIAN: SHASHI CORREA   TECHNIQUE: Helical data acquisition of the chest was obtained after administration of 75ml IV Omnipaque 350. Images were reformatted in coronal and  sagittal planes. Axial and coronal MIP images were created and reviewed.   FINDINGS: POTENTIAL LIMITATIONS OF THE STUDY: Motion   HEART AND VESSELS: No discrete filling defects within the main pulmonary artery or its branches.   Main pulmonary artery and its branches are normal in caliber.   Stable 4.2 cm aneurysmal dilation ascending thoracic aorta with mild atherosclerotic calcifications.   Moderate coronary artery calcifications are seen.The study is not optimized for evaluation of coronary arteries.   Mild cardiomegaly.   No evidence of pericardial effusion.   MEDIASTINUM AND DWAIN, LOWER NECK AND AXILLA: The visualized thyroid gland is within normal limits.   No evidence of thoracic lymphadenopathy by CT criteria.   Esophagus appears within normal limits as seen.   LUNGS AND AIRWAYS: Central airways are patent without endobronchial lesions.Mild bilateral bronchial thickening in the lower lung zones. Patchy airspace opacities in the lung bases, and upper lobes more prominent on the right, concerning for an infectious/inflammatory process such as pneumonia. Trace bilateral pleural effusions. Within the limits of evaluation, there are no suspicious pulmonary nodules. No pneumothorax.   UPPER ABDOMEN: The visualized subdiaphragmatic structures demonstrate no remarkable findings.   CHEST WALL AND OSSEOUS STRUCTURES: There are no suspicious osseous lesions. Multilevel degenerative changes are present       Examination is degraded by motion. 1. No evidence of pulmonary embolism. 2. Airspace opacities in the bilateral upper lobes and lung bases more prominent on the right concerning for an infectious/inflammatory process such as pneumonia. 3. Small right-greater-than-left pleural effusions with atelectasis. 4. Stable 4.2 cm aneurysmal dilation ascending thoracic aorta.     Signed by: Lorenzo Bangura 11/6/2023 10:40 PM Dictation workstation:   [id number]    XR chest 1 view    Result Date: 11/6/2023  Interpreted By:   Shelley Zamora, STUDY: Chest, single AP view.   INDICATION: Signs/Symptoms:shortness of breath.   COMPARISON: Chest radiograph 06/14/2021. CT chest 06/15/2021.   ACCESSION NUMBER(S): [id number]   ORDERING CLINICIAN: SHASHI CORREA   FINDINGS: The cardiac silhouette size is within normal limits. No pneumothorax. No sizeable pleural effusion.   Prominent interstitial opacities are visualized in the bilateral mid and lower lung zones. Somewhat confluent appearing opacity in the right lower lung infrahilar region.   No acute osseous abnormality.       1. Prominent interstitial opacities in the bilateral mid and lower lung zones with superimposed confluent appearing opacity in the right lower lung zone which may represent pulmonary edema with superimposed pneumonia of the right lung base not excluded. Correlate with concern for fluid overload versus pneumonia. Follow-up radiographs are suggested in 2-3 weeks to document resolution as well.   MACRO: None.   Signed by: Shelley Zamora 11/6/2023 9:51 PM Dictation workstation:   [id number]        Assessment/Plan   COVID-19+  Acute Hypoxic Respiratory Failure - improving, on room air   -Wbc 8.5 > 6.2  -Scr 0.66 > 0.81  -CRP 1.24 > 4.18   -Ddimer 1763 > 1345  -Procal: 1.44   -ALP 91 > 81   -ALT 53 > 46   -AST 42 > 41  -COVID-19: positive   -MRSA nares PCR: negative   -Blood CX: in NGTD  -CXR: Prominent interstitial opacities in the bilateral mid and lower lung zones with superimposed confluent appearing opacity in the right lower lung zone which may represent pulmonary edema with superimposed pneumonia of the right lung base not excluded. Correlate with concern for fluid overload versus pneumonia.  -CT Chest:  No evidence of pulmonary embolism. Airspace opacities in the bilateral upper lobes and lung bases more prominent on the right concerning for an infectious/inflammatory process such as pneumonia. Small right-greater-than-left pleural effusions with atelectasis.  Stable 4.2 cm aneurysmal dilation ascending thoracic aorta.     Thoracic aortic Aneurysm - stable on CT  Methamphetamine Use Disorder   Tobacco Use Disorder   Obesity (BMI 31.32)    Recommendations:  C/W dexamethasone   C/W Zosyn   -Follow counts while on Zosyn due to risk of cytopenias  C/W Remdesivir upto 5 days depending on clinical course  C/W Doxycyline  Follow CAP AG  Follow Blood Cx  Follow Sputum Cx, collect if able  Negative  HIV  Negative  Hep Panel  Procal 1.44  Monitor CBC , CMP daily   Empiric Antibiotics to cover for possible superimposed bacterial pneumonia  Trend Crp, d-dimer q48 per lab protocol  Evaluate for Baricitinib administration - will obtain consent   Monitor off O2  Monitor temperatures and counts  Monitor mentation   Encourage IS use      Salena Galeano CNP  [facility]   Answering Service [phone]  Date of service: 11/8/2023  Time of service: 2:51 PM

## 2023-11-08 NOTE — PROGRESS NOTES
Laura Vences is a 63 y.o. female on day 1 of admission presenting with COVID-19 virus infection.      Subjective   No acute events overnight.  History and chart reviewed.  Patient seen and examined.  Patient admitted with shortness of breath and found to have COVID-19 pneumonia with respiratory failure.  She was admitted to the ICU as a stepdown overflow.  Patient remains short of breath with dry cough.  Denies any chest pain, fevers or chills.  ID recommendation appreciated.  Continue remdesivir, broad-spectrum antibiotics and Decadron.  Patient require at least another 24 to 48 hours of inpatient service.    11/8: No acute events overnight.  Patient remains on room air.  She is still has a dry cough, and some shortness of breath.  She denies any fevers, chills, nausea.  She does feel weak.  We will transfer out of the ICU today.  Given the patient's extensive consolidation on her x-ray and CT scans we will continue broad-spectrum antibiotics and Decadron for another 24 hours.  Continue to encourage out of bed.  Anticipate discharge in the next 24 to 48 hours.       Objective     Last Recorded Vitals  BP 97/64   Pulse 97   Temp 37.2 °C (98.9 °F)   Resp 16   Wt 90.7 kg (199 lb 15.3 oz)   SpO2 99%   Intake/Output last 3 Shifts:    Intake/Output Summary (Last 24 hours) at 11/8/2023 1613  Last data filed at 11/8/2023 1225  Gross per 24 hour   Intake 934 ml   Output 825 ml   Net 109 ml         Admission Weight  Weight: 90.7 kg (199 lb 15.3 oz) (11/06/23 2003)    Daily Weight  11/07/23 : 90.7 kg (199 lb 15.3 oz)    Image Results  CT angio chest for pulmonary embolism  Narrative: Interpreted By:  Lorenzo Bangura,   STUDY:  CT ANGIO CHEST FOR PULMONARY EMBOLISM;  11/6/2023 10:19 pm      INDICATION:  Signs/Symptoms:shortness of breath, hypoxia, covid positive, elevated  d dimer.      COMPARISON:  CT chest 06/15/2021      ACCESSION NUMBER(S):  [id number]      ORDERING CLINICIAN:  SHASHI CORREA       TECHNIQUE:  Helical data acquisition of the chest was obtained after  administration of 75ml IV Omnipaque 350. Images were reformatted in  coronal and sagittal planes. Axial and coronal MIP images were  created and reviewed.      FINDINGS:  POTENTIAL LIMITATIONS OF THE STUDY: Motion      HEART AND VESSELS:  No discrete filling defects within the main pulmonary artery or its  branches.      Main pulmonary artery and its branches are normal in caliber.      Stable 4.2 cm aneurysmal dilation ascending thoracic aorta with mild  atherosclerotic calcifications.      Moderate coronary artery calcifications are seen.The study is not  optimized for evaluation of coronary arteries.      Mild cardiomegaly.      No evidence of pericardial effusion.      MEDIASTINUM AND DWAIN, LOWER NECK AND AXILLA:  The visualized thyroid gland is within normal limits.      No evidence of thoracic lymphadenopathy by CT criteria.      Esophagus appears within normal limits as seen.      LUNGS AND AIRWAYS:  Central airways are patent without endobronchial lesions.Mild  bilateral bronchial thickening in the lower lung zones. Patchy  airspace opacities in the lung bases, and upper lobes more prominent  on the right, concerning for an infectious/inflammatory process such  as pneumonia. Trace bilateral pleural effusions. Within the limits of  evaluation, there are no suspicious pulmonary nodules. No  pneumothorax.      UPPER ABDOMEN:  The visualized subdiaphragmatic structures demonstrate no remarkable  findings.      CHEST WALL AND OSSEOUS STRUCTURES:  There are no suspicious osseous lesions. Multilevel degenerative  changes are present      Impression: Examination is degraded by motion.  1. No evidence of pulmonary embolism.  2. Airspace opacities in the bilateral upper lobes and lung bases  more prominent on the right concerning for an infectious/inflammatory  process such as pneumonia.  3. Small right-greater-than-left pleural effusions with  atelectasis.  4. Stable 4.2 cm aneurysmal dilation ascending thoracic aorta.          Signed by: Lorenzo Bangura 11/6/2023 10:40 PM  Dictation workstation:   [id number]  XR chest 1 view  Narrative: Interpreted By:  Shelley Zamora,   STUDY:  Chest, single AP view.      INDICATION:  Signs/Symptoms:shortness of breath.      COMPARISON:  Chest radiograph 06/14/2021. CT chest 06/15/2021.      ACCESSION NUMBER(S):  [id number]      ORDERING CLINICIAN:  SHASHI CORREA      FINDINGS:  The cardiac silhouette size is within normal limits.  No pneumothorax. No sizeable pleural effusion.      Prominent interstitial opacities are visualized in the bilateral mid  and lower lung zones. Somewhat confluent appearing opacity in the  right lower lung infrahilar region.      No acute osseous abnormality.      Impression: 1. Prominent interstitial opacities in the bilateral mid and lower  lung zones with superimposed confluent appearing opacity in the right  lower lung zone which may represent pulmonary edema with superimposed  pneumonia of the right lung base not excluded. Correlate with concern  for fluid overload versus pneumonia. Follow-up radiographs are  suggested in 2-3 weeks to document resolution as well.      MACRO:  None.      Signed by: Shelley Zamora 11/6/2023 9:51 PM  Dictation workstation:   [id number]      Physical Exam  CONSTITUTIONAL - alert, in no acute distress,   SKIN - normal skin color ,warm  HEAD - no trauma, normocephalic  EYES - pupils are equal and reactive to light  CHEST - clear to auscultation, no wheezing, no crackles and no rales, good effort  CARDIAC - regular rate and regular rhythm, no murmur  ABDOMEN - no organomegaly, soft, nontender, nondistended, normal bowel sounds, no guarding/rebound/rigidity  EXTREMITIES - no edema, no deformities  NEUROLOGICAL - alert, oriented x3 and no acute focal signs  PSYCHIATRIC - alert, pleasant and cordial, age-appropriate relevant Results      Results  from last 7 days   Lab Units 11/07/23  0441 11/06/23 2013   WBC AUTO x10*3/uL 6.2 8.5   HEMOGLOBIN g/dL 14.9 14.9   HEMATOCRIT % 44.6 43.2   PLATELETS AUTO x10*3/uL 136* 142*   NEUTROS PCT AUTO %  --  84.0   LYMPHS PCT AUTO %  --  7.9   MONOS PCT AUTO %  --  7.1   EOS PCT AUTO %  --  0.1        Results from last 7 days   Lab Units 11/07/23  0441 11/06/23 2013   SODIUM mmol/L 134* 134*   POTASSIUM mmol/L 3.8 3.7   CHLORIDE mmol/L 101 101   CO2 mmol/L 23 22   BUN mg/dL 14 14   CREATININE mg/dL 0.81 0.66   GLUCOSE mg/dL 161* 102*   CALCIUM mg/dL 8.7 8.8                      Assessment/Plan                  Principal Problem:    COVID-19 virus infection  Active Problems:    Thoracic aortic aneurysm (CMS/HCC)    Acute hypoxic respiratory failure (CMS/[facility])    Class 1 obesity with body mass index (BMI) of 31.0 to 31.9 in adult    Tobacco use disorder    Methamphetamine use (CMS/HCC)    Acute COVID-19 pneumonia with superimposed bacterial pneumonia and acute hypoxic respiratory failure  Continue remdesivir  Continue broad-spectrum antibiotics  Continue Decadron  ID recommendation appreciated.  11/8: Transferred to the medical floor  Continue broad-spectrum antibiotics  Continue Decadron  Encourage out of bed  Continue aggressive pulmonary hygiene  Patient will require another 24 to 48 hours inpatient service.    Methamphetamine use  Thoracic aortic aneurysm  Tobacco use disorder  BMI of 31-31.9              Brenna Campa MD

## 2023-11-08 NOTE — NURSING NOTE
Patient requested bed alarm be turned off. Educated patient in regards to fall risk and if she were to fall that it would be her responsibility patient stated that she was fine with that and that she was doing fine getting around. Patient was found in bathroom when RN entered patients room with call going off.

## 2023-11-08 NOTE — NURSING NOTE
Patient arrived to unit. Rn agrees with ICU RN prior assessment. Patient denies pain at this time. Patient denies nausea and vomiting at this time.  Respirations unlabored at this time. Patient oriented to room and call light system.  Patient has no requests or needs at this time.  All belongings within reach and call light given to patient.  Will continue to monitor patient needs.

## 2023-11-08 NOTE — CARE PLAN
The patient's goals for the shift include  feeling better    The clinical goals for the shift include patient's SPO2 will remain >92% by end of shift     Pt progressedd to the point of being on room air, and will be moving up to [facility] shortly

## 2023-11-09 ENCOUNTER — PHARMACY VISIT (OUTPATIENT)
Dept: PHARMACY | Facility: CLINIC | Age: 63
End: 2023-11-09
Payer: MEDICAID

## 2023-11-09 VITALS
OXYGEN SATURATION: 98 % | RESPIRATION RATE: 18 BRPM | WEIGHT: 199.96 LBS | DIASTOLIC BLOOD PRESSURE: 90 MMHG | HEART RATE: 99 BPM | BODY MASS INDEX: 31.38 KG/M2 | TEMPERATURE: 98.7 F | SYSTOLIC BLOOD PRESSURE: 144 MMHG | HEIGHT: 67 IN

## 2023-11-09 LAB
ALBUMIN SERPL BCP-MCNC: 3.6 G/DL (ref 3.4–5)
ALP SERPL-CCNC: 71 U/L (ref 33–136)
ALT SERPL W P-5'-P-CCNC: 42 U/L (ref 7–45)
ANION GAP SERPL CALC-SCNC: 11 MMOL/L (ref 10–20)
AST SERPL W P-5'-P-CCNC: 35 U/L (ref 9–39)
BILIRUB SERPL-MCNC: 0.4 MG/DL (ref 0–1.2)
BUN SERPL-MCNC: 23 MG/DL (ref 6–23)
CALCIUM SERPL-MCNC: 8.8 MG/DL (ref 8.6–10.3)
CHLORIDE SERPL-SCNC: 107 MMOL/L (ref 98–107)
CO2 SERPL-SCNC: 25 MMOL/L (ref 21–32)
CREAT SERPL-MCNC: 0.72 MG/DL (ref 0.5–1.05)
GFR SERPL CREATININE-BSD FRML MDRD: >90 ML/MIN/1.73M*2
GLUCOSE SERPL-MCNC: 122 MG/DL (ref 74–99)
M PNEUMO IGM SER IA-ACNC: 0.15 U/L
POTASSIUM SERPL-SCNC: 4.3 MMOL/L (ref 3.5–5.3)
PROT SERPL-MCNC: 6.6 G/DL (ref 6.4–8.2)
SODIUM SERPL-SCNC: 139 MMOL/L (ref 136–145)

## 2023-11-09 PROCEDURE — RXMED WILLOW AMBULATORY MEDICATION CHARGE

## 2023-11-09 PROCEDURE — S4991 NICOTINE PATCH NONLEGEND: HCPCS | Performed by: INTERNAL MEDICINE

## 2023-11-09 PROCEDURE — 96372 THER/PROPH/DIAG INJ SC/IM: CPT | Performed by: INTERNAL MEDICINE

## 2023-11-09 PROCEDURE — 80053 COMPREHEN METABOLIC PANEL: CPT | Performed by: INTERNAL MEDICINE

## 2023-11-09 PROCEDURE — 2500000002 HC RX 250 W HCPCS SELF ADMINISTERED DRUGS (ALT 637 FOR MEDICARE OP, ALT 636 FOR OP/ED): Performed by: INTERNAL MEDICINE

## 2023-11-09 PROCEDURE — 36415 COLL VENOUS BLD VENIPUNCTURE: CPT | Performed by: INTERNAL MEDICINE

## 2023-11-09 PROCEDURE — 2500000004 HC RX 250 GENERAL PHARMACY W/ HCPCS (ALT 636 FOR OP/ED): Performed by: INTERNAL MEDICINE

## 2023-11-09 PROCEDURE — 99239 HOSP IP/OBS DSCHRG MGMT >30: CPT | Performed by: INTERNAL MEDICINE

## 2023-11-09 PROCEDURE — 2500000005 HC RX 250 GENERAL PHARMACY W/O HCPCS: Performed by: INTERNAL MEDICINE

## 2023-11-09 RX ORDER — AMOXICILLIN AND CLAVULANATE POTASSIUM 875; 125 MG/1; MG/1
1 TABLET, FILM COATED ORAL 2 TIMES DAILY
Qty: 10 TABLET | Refills: 0 | Status: SHIPPED | OUTPATIENT
Start: 2023-11-09 | End: 2023-11-14

## 2023-11-09 RX ORDER — AZITHROMYCIN 500 MG/1
500 TABLET, FILM COATED ORAL DAILY
Qty: 5 TABLET | Refills: 0 | Status: SHIPPED | OUTPATIENT
Start: 2023-11-09 | End: 2023-11-14

## 2023-11-09 RX ORDER — DEXAMETHASONE 6 MG/1
6 TABLET ORAL DAILY
Qty: 5 TABLET | Refills: 0 | Status: SHIPPED | OUTPATIENT
Start: 2023-11-09 | End: 2023-11-14

## 2023-11-09 RX ADMIN — PIPERACILLIN SODIUM AND TAZOBACTAM SODIUM 3.38 G: 3; .375 INJECTION, SOLUTION INTRAVENOUS at 05:52

## 2023-11-09 RX ADMIN — ACETAMINOPHEN 650 MG: 325 TABLET ORAL at 10:31

## 2023-11-09 RX ADMIN — DOXYCYCLINE 100 MG: 100 INJECTION, POWDER, LYOPHILIZED, FOR SOLUTION INTRAVENOUS at 09:43

## 2023-11-09 RX ADMIN — PANTOPRAZOLE SODIUM 40 MG: 40 TABLET, DELAYED RELEASE ORAL at 06:12

## 2023-11-09 RX ADMIN — HEPARIN SODIUM 5000 UNITS: 5000 INJECTION INTRAVENOUS; SUBCUTANEOUS at 08:25

## 2023-11-09 RX ADMIN — ACETAMINOPHEN 650 MG: 325 TABLET ORAL at 10:29

## 2023-11-09 RX ADMIN — PIPERACILLIN SODIUM AND TAZOBACTAM SODIUM 3.38 G: 3; .375 INJECTION, SOLUTION INTRAVENOUS at 10:30

## 2023-11-09 RX ADMIN — NICOTINE 1 PATCH: 14 PATCH, EXTENDED RELEASE TRANSDERMAL at 09:00

## 2023-11-09 RX ADMIN — Medication 100 MG: at 11:35

## 2023-11-09 ASSESSMENT — COGNITIVE AND FUNCTIONAL STATUS - GENERAL
DAILY ACTIVITIY SCORE: 24
MOBILITY SCORE: 24

## 2023-11-09 NOTE — CARE PLAN
The patient's goals for the shift include      The clinical goals for the shift include patient's SPO2 will remain >92% by end of shift    Over the shift, the patient did not make progress toward the following goals. Barriers to progression include . Recommendations to address these barriers include   Problem: Fall/Injury  Goal: Not fall by end of shift  Outcome: Progressing     Problem: Respiratory  Goal: Clear secretions with interventions this shift  Outcome: Progressing  Goal: Minimal/no exertional discomfort or dyspnea this shift  Outcome: Progressing  Goal: No signs of respiratory distress (eg. Use of accessory muscles. Peds grunting)  Outcome: Progressing     Problem: Pain - Adult  Goal: Verbalizes/displays adequate comfort level or baseline comfort level  Outcome: Progressing     Problem: Safety - Adult  Goal: Free from fall injury  Outcome: Progressing     Problem: Discharge Planning  Goal: Discharge to home or other facility with appropriate resources  Outcome: Progressing     Problem: Chronic Conditions and Co-morbidities  Goal: Patient's chronic conditions and co-morbidity symptoms are monitored and maintained or improved  Outcome: Progressing   .

## 2023-11-09 NOTE — DISCHARGE INSTRUCTIONS
1.  Please follow with your family doctor in 1 week's time.  2.  You will likely require repeat chest x-ray in 4 to 6 weeks time to document the resolution of your pneumonia.

## 2023-11-09 NOTE — PROGRESS NOTES
Laura Vences is a 63 y.o. female on day 2 of admission presenting with COVID-19 virus infection.    Subjective   Interval History:   Patient reports that she is feeling better.   Continues to have fatigue, SOB with activity and dry cough.   Denies any new complaints.   Weaned to room air, tolerating well, SpO2 %.  No CBC in AM  CAP AG in process  Blood Cx NG x 1 day   MRSA nares negative           Review of Systems  10 point ROS completed, negative unless otherwise mentioned in HPI.    Objective   Range of Vitals (last 24 hours)  Heart Rate:  [89-97]   Temp:  [36.2 °C (97.2 °F)-37.2 °C (98.9 °F)]   Resp:  [16]   BP: ()/(64-97)   SpO2:  [97 %-99 %]   Daily Weight  11/07/23 : 90.7 kg (199 lb 15.3 oz)    Body mass index is 31.32 kg/m².    Physical Exam  GEN: Alert, NAD  HEENT: Moist mucous membranes  NECK: Supple  LUNGS: Diminished   CV: RRR  GI: Soft, NT, ND, BS+  , No nieves, No CVAT  SKIN: Warm, Dry  EXT: No edema, No joint inflammation  PIV: C/D/I    Antibiotics  sodium chloride 0.9 % bolus 1,000 mL  ondansetron (Zofran) injection 4 mg  acetaminophen (Tylenol) tablet 650 mg  dexAMETHasone (Decadron) injection 6 mg  iohexol (OMNIPaque) 350 mg iodine/mL solution 75 mL  piperacillin-tazobactam-dextrose (Zosyn) IV 3.375 g  albuterol 90 mcg/actuation inhaler 2 puff  pantoprazole (ProtoNix) EC tablet 40 mg  pantoprazole (ProtoNix) injection 40 mg  acetaminophen (Tylenol) tablet 650 mg  acetaminophen (Tylenol) oral liquid 650 mg  acetaminophen (Tylenol) suppository 650 mg  acetaminophen (Tylenol) tablet 650 mg  acetaminophen (Tylenol) oral liquid 650 mg  acetaminophen (Tylenol) suppository 650 mg  melatonin tablet 3 mg  polyethylene glycol (Glycolax, Miralax) packet 17 g  heparin (porcine) injection 5,000 Units  dexAMETHasone (Decadron) tablet 6 mg  piperacillin-tazobactam-dextrose (Zosyn) IV 3.375 g  vancomycin (Vancocin) 2,000 mg in dextrose 5 % in water (D5W) 500 mL IV  vancomycin (Vancocin) in dextrose 5 %  water (D5W) 250 mL IV 1,250 mg  remdesivir (Veklury) 200 mg in sodium chloride 0.9% 250 mL IV  remdesivir (Veklury) 100 mg in sodium chloride 0.9% 250 mL IV  doxycycline (Vibramycin) in sodium chloride 0.9 % 100 mL  mg  oxygen (O2) therapy      Relevant Results  Labs  Results from last 72 hours   Lab Units 11/07/23 0441 11/06/23 2013   WBC AUTO x10*3/uL 6.2 8.5   HEMOGLOBIN g/dL 14.9 14.9   HEMATOCRIT % 44.6 43.2   PLATELETS AUTO x10*3/uL 136* 142*   NEUTROS PCT AUTO %  --  84.0   LYMPHS PCT AUTO %  --  7.9   MONOS PCT AUTO %  --  7.1   EOS PCT AUTO %  --  0.1       Results from last 72 hours   Lab Units 11/09/23  0612 11/07/23 0441 11/06/23 2013   SODIUM mmol/L 139 134* 134*   POTASSIUM mmol/L 4.3 3.8 3.7   CHLORIDE mmol/L 107 101 101   CO2 mmol/L 25 23 22   BUN mg/dL 23 14 14   CREATININE mg/dL 0.72 0.81 0.66   GLUCOSE mg/dL 122* 161* 102*   CALCIUM mg/dL 8.8 8.7 8.8   ANION GAP mmol/L 11 14 15   EGFR mL/min/1.73m*2 >90 82 >90       Results from last 72 hours   Lab Units 11/09/23  0612 11/07/23 0441 11/06/23 2013   ALK PHOS U/L 71 81 91   BILIRUBIN TOTAL mg/dL 0.4 0.6 0.7   PROTEIN TOTAL g/dL 6.6 7.2 7.3   ALT U/L 42 46* 53*   AST U/L 35 41* 42*   ALBUMIN g/dL 3.6 3.7 3.9       Estimated Creatinine Clearance: 93.6 mL/min (by C-G formula based on SCr of 0.72 mg/dL).  C-Reactive Protein   Date Value Ref Range Status   11/07/2023 4.18 (H) <1.00 mg/dL Final   11/06/2023 1.24 (H) <1.00 mg/dL Final     Microbiology  No results found for the last 14 days.          Imaging  CT angio chest for pulmonary embolism    Result Date: 11/6/2023  Interpreted By:  Lorenzo Bangura, STUDY: CT ANGIO CHEST FOR PULMONARY EMBOLISM;  11/6/2023 10:19 pm   INDICATION: Signs/Symptoms:shortness of breath, hypoxia, covid positive, elevated d dimer.   COMPARISON: CT chest 06/15/2021   ACCESSION NUMBER(S): [id number]   ORDERING CLINICIAN: SHASHI CORREA   TECHNIQUE: Helical data acquisition of the chest was obtained after  administration of 75ml IV Omnipaque 350. Images were reformatted in coronal and sagittal planes. Axial and coronal MIP images were created and reviewed.   FINDINGS: POTENTIAL LIMITATIONS OF THE STUDY: Motion   HEART AND VESSELS: No discrete filling defects within the main pulmonary artery or its branches.   Main pulmonary artery and its branches are normal in caliber.   Stable 4.2 cm aneurysmal dilation ascending thoracic aorta with mild atherosclerotic calcifications.   Moderate coronary artery calcifications are seen.The study is not optimized for evaluation of coronary arteries.   Mild cardiomegaly.   No evidence of pericardial effusion.   MEDIASTINUM AND DWAIN, LOWER NECK AND AXILLA: The visualized thyroid gland is within normal limits.   No evidence of thoracic lymphadenopathy by CT criteria.   Esophagus appears within normal limits as seen.   LUNGS AND AIRWAYS: Central airways are patent without endobronchial lesions.Mild bilateral bronchial thickening in the lower lung zones. Patchy airspace opacities in the lung bases, and upper lobes more prominent on the right, concerning for an infectious/inflammatory process such as pneumonia. Trace bilateral pleural effusions. Within the limits of evaluation, there are no suspicious pulmonary nodules. No pneumothorax.   UPPER ABDOMEN: The visualized subdiaphragmatic structures demonstrate no remarkable findings.   CHEST WALL AND OSSEOUS STRUCTURES: There are no suspicious osseous lesions. Multilevel degenerative changes are present       Examination is degraded by motion. 1. No evidence of pulmonary embolism. 2. Airspace opacities in the bilateral upper lobes and lung bases more prominent on the right concerning for an infectious/inflammatory process such as pneumonia. 3. Small right-greater-than-left pleural effusions with atelectasis. 4. Stable 4.2 cm aneurysmal dilation ascending thoracic aorta.     Signed by: Lorenzo Bangura 11/6/2023 10:40 PM Dictation workstation:    [id number]    XR chest 1 view    Result Date: 11/6/2023  Interpreted By:  Shelley Zamora, STUDY: Chest, single AP view.   INDICATION: Signs/Symptoms:shortness of breath.   COMPARISON: Chest radiograph 06/14/2021. CT chest 06/15/2021.   ACCESSION NUMBER(S): [id number]   ORDERING CLINICIAN: SHASHI CORREA   FINDINGS: The cardiac silhouette size is within normal limits. No pneumothorax. No sizeable pleural effusion.   Prominent interstitial opacities are visualized in the bilateral mid and lower lung zones. Somewhat confluent appearing opacity in the right lower lung infrahilar region.   No acute osseous abnormality.       1. Prominent interstitial opacities in the bilateral mid and lower lung zones with superimposed confluent appearing opacity in the right lower lung zone which may represent pulmonary edema with superimposed pneumonia of the right lung base not excluded. Correlate with concern for fluid overload versus pneumonia. Follow-up radiographs are suggested in 2-3 weeks to document resolution as well.   MACRO: None.   Signed by: Shelley Zamora 11/6/2023 9:51 PM Dictation workstation:   [id number]        Assessment/Plan   COVID-19+  Acute Hypoxic Respiratory Failure - improving, on room air   -Wbc 8.5 > 6.2  -Scr 0.66 > 0.81 > 0.72  -CRP 1.24 > 4.18   -Ddimer 1763 > 1345  -Procal: 1.44   -ALP 91 > 81   -ALT 53 > 46   -AST 42 > 41  -COVID-19: positive   -MRSA nares PCR: negative   -Blood CX: in NGTD  -CXR: Prominent interstitial opacities in the bilateral mid and lower lung zones with superimposed confluent appearing opacity in the right lower lung zone which may represent pulmonary edema with superimposed pneumonia of the right lung base not excluded. Correlate with concern for fluid overload versus pneumonia.  -CT Chest:  No evidence of pulmonary embolism. Airspace opacities in the bilateral upper lobes and lung bases more prominent on the right concerning for an infectious/inflammatory process  such as pneumonia. Small right-greater-than-left pleural effusions with atelectasis. Stable 4.2 cm aneurysmal dilation ascending thoracic aorta.     Thoracic aortic Aneurysm - stable on CT  Methamphetamine Use Disorder   Tobacco Use Disorder   Obesity (BMI 31.32)    Recommendations:  C/W dexamethasone   C/W Zosyn   -Follow counts while on Zosyn due to risk of cytopenias  C/W Remdesivir upto 5 days depending on clinical course  C/W Doxycyline  Follow CAP AG  Follow Blood Cx  Follow Sputum Cx, collect if able  Negative  HIV  Negative  Hep Panel  Procal 1.44  Monitor CBC , CMP daily   Empiric Antibiotics to cover for possible superimposed bacterial pneumonia  Trend Crp, d-dimer q48 per lab protocol  Evaluate for Baricitinib administration - will obtain consent   Monitor off O2  Monitor temperatures and counts  Monitor mentation   Encourage IS use     May discharge patient on doxycyline 100mg BID x 1 week total, Remdesivir may be discontinued upon discharge.    Plans d/w ID attending and patient.      Salena Rocheport, CNP  [facility]   Answering Service [phone]  Date of service: 11/9/2023  Time of service: 12:09 PM

## 2023-11-09 NOTE — DISCHARGE SUMMARY
Discharge Diagnosis  COVID-19 pneumonia with acute hypoxic respiratory failure  Superimposed presumed streptococcal pneumonia pneumonia    Issues Requiring Follow-Up  Follow-up chest ray in 4 to 6 weeks time.  Discharge Meds     Your medication list        START taking these medications        Instructions Last Dose Given Next Dose Due   amoxicillin-pot clavulanate 875-125 mg tablet  Commonly known as: Augmentin      Take 1 tablet (875 mg) by mouth 2 times a day for 5 days.       azithromycin 500 mg tablet  Commonly known as: Zithromax      Take 1 tablet (500 mg) by mouth once daily for 5 days.       dexAMETHasone 6 mg tablet  Commonly known as: Decadron      Take 1 tablet (6 mg) by mouth once daily for 5 days.                 Where to Get Your Medications        These medications were sent to [facility]  [address] [id number]      Hours: 8AM to 6PM Mon-Fri, 8AM to 2PM Sat, 8AM to 12PM Sun Phone: [phone]   amoxicillin-pot clavulanate 875-125 mg tablet  azithromycin 500 mg tablet  dexAMETHasone 6 mg tablet         Test Results Pending At Discharge  Pending Labs       Order Current Status    Legionella Antigen, Urine In process    Mycoplasma Pneumoniae Antibody, IgM In process    Streptococcus pneumoniae Antigen, Urine In process    Blood Culture Preliminary result    Blood Culture Preliminary result            Hospital Course    No acute events overnight.  History and chart reviewed.  Patient seen and examined.  Patient admitted with shortness of breath and found to have COVID-19 pneumonia with respiratory failure.  She was admitted to the ICU as a stepdown overflow.  Patient remains short of breath with dry cough.  Denies any chest pain, fevers or chills.  ID recommendation appreciated.  Continue remdesivir, broad-spectrum antibiotics and Decadron.  Patient require at least another 24 to 48 hours of inpatient service.     11/8: No acute events overnight.  Patient remains on room  air.  She is still has a dry cough, and some shortness of breath.  She denies any fevers, chills, nausea.  She does feel weak.  We will transfer out of the ICU today.  Given the patient's extensive consolidation on her x-ray and CT scans we will continue broad-spectrum antibiotics and Decadron for another 24 hours.  Continue to encourage out of bed.  Anticipate discharge in the next 24 to 48 hours.    11/9: No acute events overnight.  Patient still feels weak and has a dry cough.  She has had some loose bowel motions.  But otherwise is on room air.  Vitals are stable.  Lab work unremarkable.  We will discharge the patient home today to follow-up with her PCP in 1 week's time.  We will continue Augmentin and azithromycin for another 5 days.  We will continue Decadron for another 5 days.  Patient will require repeat chest x-ray in 4 to 6 weeks time to ensure resolution of her infiltrates.    Discharge planning took more than 35 minutes.      Pertinent Physical Exam At Time of Discharge  Physical Exam  CONSTITUTIONAL - alert, in no acute distress, not ill-appearing  SKIN - normal skin color ,warm  HEAD - no trauma, normocephalic  EYES - pupils are equal and reactive to light  CHEST - clear to auscultation, no wheezing, no crackles and no rales, good effort  CARDIAC - regular rate and regular rhythm, no murmur  ABDOMEN - no organomegaly, soft, nontender, nondistended, normal bowel sounds, no guarding/rebound/rigidity  EXTREMITIES - no edema, no deformities  NEUROLOGICAL - alert, oriented x3 and no acute focal signs  PSYCHIATRIC - alert, pleasant and cordial, age-appropriate    Outpatient Follow-Up  No future appointments.      Brenna Campa MD

## 2023-11-11 LAB
BACTERIA BLD CULT: NORMAL
BACTERIA BLD CULT: NORMAL

## 2023-11-21 NOTE — DOCUMENTATION CLARIFICATION NOTE
"    PATIENT:               SCOTT SUN  ACCT #:                  [id number]  MRN:                       [id number]  :                       1960  ADMIT DATE:       2023 7:54 PM  DISCH DATE:        2023 5:32 PM  RESPONDING PROVIDER #:        [id number]          PROVIDER RESPONSE TEXT:    Sepsis poa with organ dysfunction of acute respiratory failure, thrombocytopenia, elevated liver enzymes    CDI QUERY TEXT:    [facility]_Sepsis      Instruction:  Based on your assessment of the patient and the clinical information, please provide the requested documentation by clicking on the appropriate radio button and enter any additional information if prompted.    Question: Is there a diagnosis indicative of a patient meeting SIRS criteria and with organ dysfunction in the setting of COVID pneumonia/bacterial pneumonia infection    When answering this query, please exercise your independent professional judgment. The fact that a question is being asked, does not imply that any particular answer is desired or expected.    The patient's clinical indicators include:  Clinical Information: 63 yr. old admitted with acute COVID-19 pneumonia with superimposed bacterial pneumonia and acute hypoxic respiratory failure.    Clinical Indicators:  23 Vital signs: On admit-T 100.1, Hr 114, rr 32, bp 138/90, Pox 93 percent requiring up to 10 liters of oxygen.  Labs on admit: Wbc 8.5, Glucose 102, CRP 1.24, ALT 53, AST 42, INR 1.2, Platelets 142, Blood cultures with no growth,  Covid positive, Lactate 1.2, Bun/cr 14/0.66, Procalcitonin: 1.44  23 CXR: \"Prominent interstitial opacities in the bilateral mid and lower  lung zones with superimposed confluent appearing opacity in the right  lower lung zone which may represent pulmonary edema with superimposed  pneumonia of the right lung base not excluded. Correlate with concern  for fluid overload versus pneumonia.    Treatment: IV 1000 cc NS fluid bolus, IV Zosyn, IV Vancomycin, IV " Doxycycline, IV Remdesivir, Decadron, ID consult, High liter flow oxygen, Respiratory tx.    Risk Factors: Bacterial pneumonia  Options provided:  -- Sepsis poa with organ dysfunction of acute respiratory failure, thrombocytopenia, elevated liver enzymes  -- Sepsis with other organ dysfunction, Please specify additional information below  -- Patient treated for COVID pneumonia/bacterial pneumonia without sepsis  -- Other - I will add my own diagnosis  -- Refer to Clinical Documentation Reviewer    Query created by: Rosalee Motta on 11/9/2023 6:17 AM      Electronically signed by:  STEWART MENDOZA MD 11/20/2023 8:11 PM

## 2024-02-21 ENCOUNTER — OFFICE VISIT (OUTPATIENT)
Dept: URBAN - METROPOLITAN AREA CLINIC 51 | Facility: CLINIC | Age: 64
End: 2024-02-21
Payer: COMMERCIAL

## 2024-02-21 DIAGNOSIS — G93.2 BENIGN INTRACRANIAL HYPERTENSION: ICD-10-CM

## 2024-02-21 DIAGNOSIS — H25.813 COMBINED FORMS OF AGE-RELATED CATARACT, BILATERAL: Primary | ICD-10-CM

## 2024-02-21 PROCEDURE — 92133 CPTRZD OPH DX IMG PST SGM ON: CPT | Performed by: OPHTHALMOLOGY

## 2024-02-21 PROCEDURE — 92004 COMPRE OPH EXAM NEW PT 1/>: CPT | Performed by: OPHTHALMOLOGY

## 2024-02-21 ASSESSMENT — INTRAOCULAR PRESSURE
OD: 16
OS: 15

## 2024-03-20 ENCOUNTER — OFFICE VISIT (OUTPATIENT)
Dept: URBAN - METROPOLITAN AREA CLINIC 51 | Facility: CLINIC | Age: 64
End: 2024-03-20
Payer: COMMERCIAL

## 2024-03-20 DIAGNOSIS — G93.2 BENIGN INTRACRANIAL HYPERTENSION: Primary | ICD-10-CM

## 2024-03-20 PROCEDURE — 92083 EXTENDED VISUAL FIELD XM: CPT | Performed by: OPHTHALMOLOGY

## 2024-03-20 PROCEDURE — 92012 INTRM OPH EXAM EST PATIENT: CPT | Performed by: OPHTHALMOLOGY

## 2024-03-20 ASSESSMENT — INTRAOCULAR PRESSURE
OS: 16
OD: 15

## 2024-03-20 ASSESSMENT — VISUAL ACUITY
OD: 20/25
OS: 20/30

## 2024-09-18 ENCOUNTER — OFFICE VISIT (OUTPATIENT)
Dept: URBAN - METROPOLITAN AREA CLINIC 51 | Facility: CLINIC | Age: 64
End: 2024-09-18
Payer: COMMERCIAL

## 2024-09-18 DIAGNOSIS — G93.2 BENIGN INTRACRANIAL HYPERTENSION: Primary | ICD-10-CM

## 2024-09-18 PROCEDURE — 92012 INTRM OPH EXAM EST PATIENT: CPT | Performed by: OPHTHALMOLOGY

## 2024-09-18 ASSESSMENT — INTRAOCULAR PRESSURE
OS: 16
OD: 17

## (undated) DEVICE — CYSTO: Brand: MEDLINE INDUSTRIES, INC.

## (undated) DEVICE — DRAPE,UNDERBUTTOCKS,PCH,STERILE: Brand: MEDLINE

## (undated) DEVICE — PVC URETHRAL CATHETER: Brand: DOVER

## (undated) DEVICE — PERI-PAD,MODERATE: Brand: CURITY

## (undated) DEVICE — DRAPE TWL SURG 16X26IN BLU ORB04] ALLCARE INC]

## (undated) DEVICE — GOWN,REINF,POLY,ECL,PP SLV,XL: Brand: MEDLINE

## (undated) DEVICE — TRAY PREP DRY W/ PREM GLV 2 APPL 6 SPNG 2 UNDPD 1 OVERWRAP

## (undated) DEVICE — CONTAINER SPEC FRMLN 120ML --

## (undated) DEVICE — SOLUTION IV 1000ML 0.9% SOD CHL

## (undated) DEVICE — TELFA NON-ADHERENT ABSORBENT DRESSING: Brand: TELFA

## (undated) DEVICE — CARDINAL HEALTH FLEXIBLE LIGHT HANDLE COVER: Brand: CARDINAL HEALTH

## (undated) DEVICE — SOLUTION IRRIG 3000ML 0.9% SOD CHL FLX CONT 0797208] ICU MEDICAL INC]